# Patient Record
Sex: FEMALE | Race: WHITE | Employment: UNEMPLOYED | URBAN - METROPOLITAN AREA
[De-identification: names, ages, dates, MRNs, and addresses within clinical notes are randomized per-mention and may not be internally consistent; named-entity substitution may affect disease eponyms.]

---

## 2016-12-27 LAB
ANTIBODY SCREEN OB: NEGATIVE
HEPATITIS B SURFACE ANTIGEN OB: NEGATIVE
HIV RESULT OB: NEGATIVE
RAPID PLASMA REAGIN OB: NONREACTIVE
RH FACTOR OB: POSITIVE

## 2017-02-09 ENCOUNTER — OFFICE VISIT (OUTPATIENT)
Dept: FAMILY MEDICINE CLINIC | Facility: CLINIC | Age: 37
End: 2017-02-09

## 2017-02-09 VITALS
TEMPERATURE: 98 F | HEART RATE: 67 BPM | BODY MASS INDEX: 22.76 KG/M2 | OXYGEN SATURATION: 99 % | SYSTOLIC BLOOD PRESSURE: 120 MMHG | HEIGHT: 67 IN | DIASTOLIC BLOOD PRESSURE: 76 MMHG | WEIGHT: 145 LBS | RESPIRATION RATE: 12 BRPM

## 2017-02-09 DIAGNOSIS — J01.00 ACUTE NON-RECURRENT MAXILLARY SINUSITIS: Primary | ICD-10-CM

## 2017-02-09 PROCEDURE — 99213 OFFICE O/P EST LOW 20 MIN: CPT | Performed by: PHYSICIAN ASSISTANT

## 2017-02-09 RX ORDER — AZITHROMYCIN 250 MG/1
TABLET, FILM COATED ORAL
Qty: 6 TABLET | Refills: 0 | Status: ON HOLD | OUTPATIENT
Start: 2017-02-09 | End: 2017-08-02

## 2017-02-09 NOTE — PATIENT INSTRUCTIONS
Please follow up with your PCP if no improvement within 5-7 days. Go directly to the ER for any acute worsening of symptoms. Call OB and check first for z-pack.  Should be ok  · Return to clinic or follow up with PCP for further evaluation if symptoms are

## 2017-02-09 NOTE — PROGRESS NOTES
CHIEF COMPLAINT:   Patient presents with:  Sinus Problem: thick green mucous, nasal congestion. 10 days. Headache. Worsened in the past 5 days    HPI:   Thierry Duarte is a 29year old female who presents for sinus congestion for  10  days.  Symptoms hav Resp 12  Ht 67\"  Wt 145 lb  BMI 22.71 kg/m2  SpO2 99%  LMP   GENERAL: well developed, well nourished,in no apparent distress  SKIN: no rashes,no suspicious lesions  HEAD: atraumatic, normocephalic,  + tenderness on palpation of maxillary sinuses  EYES: c evaluation if symptoms are not improved within 48-72 hours  · Go to ER if facial or periorbital swelling develops  · Please take all of your antibiotic as prescribed or the infection will be treated ineffectively and may return  · Humidify the air.   Steam

## 2017-03-28 ENCOUNTER — OFFICE VISIT (OUTPATIENT)
Dept: PERINATAL CARE | Facility: HOSPITAL | Age: 37
End: 2017-03-28
Attending: OBSTETRICS & GYNECOLOGY
Payer: COMMERCIAL

## 2017-03-28 VITALS
HEIGHT: 67 IN | WEIGHT: 159 LBS | BODY MASS INDEX: 24.96 KG/M2 | SYSTOLIC BLOOD PRESSURE: 114 MMHG | HEART RATE: 86 BPM | DIASTOLIC BLOOD PRESSURE: 76 MMHG

## 2017-03-28 DIAGNOSIS — O09.812 PREGNANCY RESULTING FROM ASSISTED REPRODUCTIVE TECHNOLOGY, SECOND TRIMESTER: ICD-10-CM

## 2017-03-28 DIAGNOSIS — O09.522 AMA (ADVANCED MATERNAL AGE) MULTIGRAVIDA 35+, SECOND TRIMESTER: Primary | ICD-10-CM

## 2017-03-28 PROBLEM — O09.529 AMA (ADVANCED MATERNAL AGE) MULTIGRAVIDA 35+ (HCC): Status: ACTIVE | Noted: 2017-03-28

## 2017-03-28 PROBLEM — O09.819 PREGNANCY RESULTING FROM ASSISTED REPRODUCTIVE TECHNOLOGY (HCC): Status: ACTIVE | Noted: 2017-03-28

## 2017-03-28 PROBLEM — O09.819 PREGNANCY RESULTING FROM ASSISTED REPRODUCTIVE TECHNOLOGY: Status: ACTIVE | Noted: 2017-03-28

## 2017-03-28 PROBLEM — O09.529 AMA (ADVANCED MATERNAL AGE) MULTIGRAVIDA 35+: Status: ACTIVE | Noted: 2017-03-28

## 2017-03-28 PROCEDURE — 99243 OFF/OP CNSLTJ NEW/EST LOW 30: CPT

## 2017-03-28 RX ORDER — CHOLECALCIFEROL (VITAMIN D3) 25 MCG
1 TABLET,CHEWABLE ORAL DAILY
COMMUNITY
End: 2019-03-11 | Stop reason: ALTCHOICE

## 2017-03-28 NOTE — PROGRESS NOTES
Outpatient Maternal-Fetal Medicine Consultation    Dear Dr. Wilson Alfonso    Thank you for requesting ultrasound evaluation and maternal fetal medicine consultation on your patient Nirav Mattson.   As you are aware she is a 40year old female [id number] with a is alive. Medications:   Current outpatient prescriptions:   •  prenatal multivitamin plus DHA 27-0.8-228 MG Oral Cap, Take 1 capsule by mouth daily. , Disp: , Rfl:   •  azithromycin 250 MG Oral Tab, Take two tablets by mouth today, then one daily. , Dis with normal appearance: head, face, spine, neck, skin, chest, abdominal wall, gastrointestinal tract, kidneys, bladder, extremities.     Brain: Visualized and normal appearance: brain parenchyma, cerebral ventricles, choroid plexus, Cisterna Magna, midline that the patient will be 29years old at the time of delivery I reviewed that her risk (at delivery) of having a  with any chromosome abnormality is 1:192  and with trisomy 24 is 1: 80.     Invasive Testing    I offered invasive genetic testing (amn Stillbirth and  mortality rates appear to be increased as much as four-fold.     [facility] ART pregnancies, the relative risk of common pregnancy complications such as fetal growth restriction, preeclampsia, prematurity and  mortality are

## 2017-04-21 ENCOUNTER — OFFICE VISIT (OUTPATIENT)
Dept: PERINATAL CARE | Facility: HOSPITAL | Age: 37
End: 2017-04-21
Attending: OBSTETRICS & GYNECOLOGY
Payer: COMMERCIAL

## 2017-04-21 VITALS
DIASTOLIC BLOOD PRESSURE: 68 MMHG | HEART RATE: 88 BPM | SYSTOLIC BLOOD PRESSURE: 125 MMHG | BODY MASS INDEX: 25 KG/M2 | WEIGHT: 159 LBS

## 2017-04-21 DIAGNOSIS — O09.812 PREGNANCY RESULTING FROM ASSISTED REPRODUCTIVE TECHNOLOGY, SECOND TRIMESTER: ICD-10-CM

## 2017-04-21 DIAGNOSIS — O09.522 AMA (ADVANCED MATERNAL AGE) MULTIGRAVIDA 35+, SECOND TRIMESTER: Primary | ICD-10-CM

## 2017-04-21 PROCEDURE — 99213 OFFICE O/P EST LOW 20 MIN: CPT

## 2017-04-21 PROCEDURE — 93325 DOPPLER ECHO COLOR FLOW MAPG: CPT

## 2017-04-21 PROCEDURE — 76827 ECHO EXAM OF FETAL HEART: CPT

## 2017-04-21 PROCEDURE — 76825 ECHO EXAM OF FETAL HEART: CPT

## 2017-04-21 NOTE — PROGRESS NOTES
Indication: IVF.   ____________________________________________________________________________  History: Age: 40 years. Maternal age at [facility]: 40 years. : 5 Para: 2.  Last menstrual period: 2016.  ________________________________________________ pericardial or pleural effusion. The A-V conduction is one to one and the heart rate is appropriate for gestational age. No evidence of fetal arrhythmias is seen during today's study. There is a right to left shunting across the patent foramen ovale.   Valeta Rank

## 2017-07-17 LAB — STREP GP B CULT OB: NEGATIVE

## 2017-07-30 ENCOUNTER — HOSPITAL ENCOUNTER (OUTPATIENT)
Facility: HOSPITAL | Age: 37
Setting detail: OBSERVATION
Discharge: HOME | End: 2017-07-30
Attending: OBSTETRICS & GYNECOLOGY | Admitting: OBSTETRICS & GYNECOLOGY
Payer: COMMERCIAL

## 2017-07-30 VITALS
HEIGHT: 66 IN | HEART RATE: 96 BPM | BODY MASS INDEX: 28.93 KG/M2 | SYSTOLIC BLOOD PRESSURE: 125 MMHG | WEIGHT: 180 LBS | DIASTOLIC BLOOD PRESSURE: 86 MMHG | RESPIRATION RATE: 22 BRPM | TEMPERATURE: 98 F

## 2017-07-30 VITALS
SYSTOLIC BLOOD PRESSURE: 128 MMHG | WEIGHT: 180 LBS | BODY MASS INDEX: 28.93 KG/M2 | HEIGHT: 66 IN | TEMPERATURE: 99 F | DIASTOLIC BLOOD PRESSURE: 83 MMHG | RESPIRATION RATE: 18 BRPM | HEART RATE: 96 BPM

## 2017-07-30 PROBLEM — Z34.90 PREGNANCY: Status: ACTIVE | Noted: 2017-07-30

## 2017-07-30 PROBLEM — Z34.90 PREGNANCY (HCC): Status: ACTIVE | Noted: 2017-07-30

## 2017-07-30 LAB
BILIRUBIN URINE: NEGATIVE
BILIRUBIN URINE: NEGATIVE
BLOOD URINE: NEGATIVE
CONTROL RUN WITHIN 24 HOURS?: YES
CONTROL RUN WITHIN 24 HOURS?: YES
GLUCOSE URINE: NEGATIVE
GLUCOSE URINE: NEGATIVE
KETONE URINE: NEGATIVE
KETONE URINE: NEGATIVE
LEUKOCYTE ESTERASE URINE: NEGATIVE
NITRITE URINE: NEGATIVE
NITRITE URINE: NEGATIVE
PH URINE: 6 (ref 5–8)
PH URINE: 7 (ref 5–8)
PROTEIN URINE: 30
PROTEIN URINE: NEGATIVE
SPEC GRAVITY: 1.01 (ref 1–1.03)
SPEC GRAVITY: 1.02 (ref 1–1.03)
URINE CLARITY: CLEAR
URINE CLARITY: CLEAR
URINE COLOR: YELLOW
URINE COLOR: YELLOW
UROBILINOGEN URINE: 0.2
UROBILINOGEN URINE: 0.2

## 2017-07-30 PROCEDURE — 59025 FETAL NON-STRESS TEST: CPT

## 2017-07-30 PROCEDURE — 81002 URINALYSIS NONAUTO W/O SCOPE: CPT

## 2017-07-30 RX ORDER — MELATONIN
325
Status: ON HOLD | COMMUNITY
End: 2017-08-02

## 2017-07-30 RX ORDER — GARLIC EXTRACT 500 MG
1 CAPSULE ORAL DAILY
COMMUNITY
End: 2019-03-11 | Stop reason: ALTCHOICE

## 2017-07-30 RX ORDER — PYRIDOXINE HCL (VITAMIN B6) 100 MG
TABLET ORAL
COMMUNITY
End: 2017-12-05

## 2017-07-30 NOTE — PROGRESS NOTES
Pt is a 40year old female admitted to [facility]. Patient presents with:  R/o Labor     Pt is [id number] 38w3d intra-uterine pregnancy. History obtained, consents signed. Oriented to room, staff, and plan of care.

## 2017-07-30 NOTE — NST
Nonstress Test   Patient: Ira Zimmerman    Gestation: 38w3d    NST:       Variability: Moderate           Accelerations: Yes           Decelerations: None            Baseline: 130 BPM           Uterine Irritability: Yes           Contractions: Nito Ring

## 2017-07-30 NOTE — PROGRESS NOTES
Pt is a 40year old female admitted to [facility]. Patient presents with:  R/o Labor: C/o icreasingly painful contractions beginning at 0800 on 07/29      Pt is [id number] 38w3d intra-uterine pregnancy. History obtained, consents signed.  Oriented to room,

## 2017-07-30 NOTE — PROGRESS NOTES
Pt. Up to walk x2 hours and then to be re-evaluated for labor. Pt. Walking with her  and instructed to return sooner with SROM or bleeding or more intense contractions.

## 2017-07-31 ENCOUNTER — HOSPITAL ENCOUNTER (INPATIENT)
Facility: HOSPITAL | Age: 37
LOS: 2 days | Discharge: HOME | End: 2017-08-02
Attending: OBSTETRICS & GYNECOLOGY | Admitting: OBSTETRICS & GYNECOLOGY
Payer: COMMERCIAL

## 2017-07-31 LAB
ANTIBODY SCREEN: NEGATIVE
BILIRUBIN URINE: NEGATIVE
CONTROL RUN WITHIN 24 HOURS?: YES
ERYTHROCYTE [DISTWIDTH] IN BLOOD BY AUTOMATED COUNT: 12.8 % (ref 11.5–16)
GLUCOSE URINE: NEGATIVE
HCT VFR BLD AUTO: 36.1 % (ref 34–50)
HGB BLD-MCNC: 12.4 G/DL (ref 12–16)
KETONE URINE: NEGATIVE
LEUKOCYTE ESTERASE URINE: NEGATIVE
MCH RBC QN AUTO: 31.6 PG (ref 27–33.2)
MCHC RBC AUTO-ENTMCNC: 34.3 G/DL (ref 31–37)
MCV RBC AUTO: 92.1 FL (ref 81–100)
NITRITE URINE: NEGATIVE
PH URINE: 7 (ref 5–8)
PLATELET # BLD AUTO: 164 10(3)UL (ref 150–450)
PROTEIN URINE: NEGATIVE
RBC # BLD AUTO: 3.92 X10(6)UL (ref 3.8–5.1)
RED CELL DISTRIBUTION WIDTH-SD: 42.6 FL (ref 35.1–46.3)
RH BLOOD TYPE: POSITIVE
SPEC GRAVITY: 1.01 (ref 1–1.03)
T PALLIDUM AB SER QL IA: NONREACTIVE
URINE CLARITY: CLEAR
URINE COLOR: YELLOW
UROBILINOGEN URINE: 0.2
WBC # BLD AUTO: 6.8 X10(3) UL (ref 4–13)

## 2017-07-31 PROCEDURE — 86901 BLOOD TYPING SEROLOGIC RH(D): CPT | Performed by: OBSTETRICS & GYNECOLOGY

## 2017-07-31 PROCEDURE — 0KQM0ZZ REPAIR PERINEUM MUSCLE, OPEN APPROACH: ICD-10-PCS | Performed by: OBSTETRICS & GYNECOLOGY

## 2017-07-31 PROCEDURE — 86900 BLOOD TYPING SEROLOGIC ABO: CPT | Performed by: OBSTETRICS & GYNECOLOGY

## 2017-07-31 PROCEDURE — 85027 COMPLETE CBC AUTOMATED: CPT | Performed by: OBSTETRICS & GYNECOLOGY

## 2017-07-31 PROCEDURE — 81002 URINALYSIS NONAUTO W/O SCOPE: CPT

## 2017-07-31 PROCEDURE — 3E033VJ INTRODUCTION OF OTHER HORMONE INTO PERIPHERAL VEIN, PERCUTANEOUS APPROACH: ICD-10-PCS | Performed by: OBSTETRICS & GYNECOLOGY

## 2017-07-31 PROCEDURE — 86780 TREPONEMA PALLIDUM: CPT | Performed by: OBSTETRICS & GYNECOLOGY

## 2017-07-31 PROCEDURE — 86850 RBC ANTIBODY SCREEN: CPT | Performed by: OBSTETRICS & GYNECOLOGY

## 2017-07-31 RX ORDER — IBUPROFEN 600 MG/1
600 TABLET ORAL ONCE AS NEEDED
Status: DISCONTINUED | OUTPATIENT
Start: 2017-07-31 | End: 2017-08-02

## 2017-07-31 RX ORDER — SODIUM CHLORIDE, SODIUM LACTATE, POTASSIUM CHLORIDE, CALCIUM CHLORIDE 600; 310; 30; 20 MG/100ML; MG/100ML; MG/100ML; MG/100ML
INJECTION, SOLUTION INTRAVENOUS CONTINUOUS
Status: DISCONTINUED | OUTPATIENT
Start: 2017-07-31 | End: 2017-08-02

## 2017-07-31 RX ORDER — TERBUTALINE SULFATE 1 MG/ML
0.25 INJECTION, SOLUTION SUBCUTANEOUS AS NEEDED
Status: DISCONTINUED | OUTPATIENT
Start: 2017-07-31 | End: 2017-07-31 | Stop reason: HOSPADM

## 2017-07-31 RX ORDER — SIMETHICONE 80 MG
80 TABLET,CHEWABLE ORAL 3 TIMES DAILY PRN
Status: DISCONTINUED | OUTPATIENT
Start: 2017-07-31 | End: 2017-08-02

## 2017-07-31 RX ORDER — DEXTROSE, SODIUM CHLORIDE, SODIUM LACTATE, POTASSIUM CHLORIDE, AND CALCIUM CHLORIDE 5; .6; .31; .03; .02 G/100ML; G/100ML; G/100ML; G/100ML; G/100ML
INJECTION, SOLUTION INTRAVENOUS AS NEEDED
Status: DISCONTINUED | OUTPATIENT
Start: 2017-07-31 | End: 2017-07-31 | Stop reason: HOSPADM

## 2017-07-31 RX ORDER — ONDANSETRON 2 MG/ML
4 INJECTION INTRAMUSCULAR; INTRAVENOUS EVERY 6 HOURS PRN
Status: DISCONTINUED | OUTPATIENT
Start: 2017-07-31 | End: 2017-08-02

## 2017-07-31 RX ORDER — NALBUPHINE HCL 10 MG/ML
2.5 AMPUL (ML) INJECTION
Status: DISCONTINUED | OUTPATIENT
Start: 2017-07-31 | End: 2017-08-02

## 2017-07-31 RX ORDER — ZOLPIDEM TARTRATE 5 MG/1
5 TABLET ORAL NIGHTLY PRN
Status: DISCONTINUED | OUTPATIENT
Start: 2017-07-31 | End: 2017-08-02

## 2017-07-31 RX ORDER — HYDROCODONE BITARTRATE AND ACETAMINOPHEN 5; 325 MG/1; MG/1
1 TABLET ORAL EVERY 4 HOURS PRN
Status: DISCONTINUED | OUTPATIENT
Start: 2017-07-31 | End: 2017-08-02

## 2017-07-31 RX ORDER — DOCUSATE SODIUM 100 MG/1
100 CAPSULE, LIQUID FILLED ORAL
Status: DISCONTINUED | OUTPATIENT
Start: 2017-07-31 | End: 2017-08-02

## 2017-07-31 RX ORDER — ACETAMINOPHEN 325 MG/1
650 TABLET ORAL EVERY 4 HOURS PRN
Status: DISCONTINUED | OUTPATIENT
Start: 2017-07-31 | End: 2017-08-02

## 2017-07-31 RX ORDER — IBUPROFEN 600 MG/1
600 TABLET ORAL EVERY 6 HOURS
Status: DISCONTINUED | OUTPATIENT
Start: 2017-07-31 | End: 2017-08-02

## 2017-07-31 RX ORDER — BISACODYL 10 MG
10 SUPPOSITORY, RECTAL RECTAL ONCE AS NEEDED
Status: ACTIVE | OUTPATIENT
Start: 2017-07-31 | End: 2017-07-31

## 2017-07-31 RX ORDER — EPHEDRINE SULFATE 50 MG/ML
5 INJECTION, SOLUTION INTRAVENOUS AS NEEDED
Status: DISCONTINUED | OUTPATIENT
Start: 2017-07-31 | End: 2017-07-31

## 2017-07-31 RX ORDER — HYDROCODONE BITARTRATE AND ACETAMINOPHEN 5; 325 MG/1; MG/1
2 TABLET ORAL EVERY 4 HOURS PRN
Status: DISCONTINUED | OUTPATIENT
Start: 2017-07-31 | End: 2017-08-02

## 2017-07-31 NOTE — NST
Nonstress Test   Patient: Norm Wynn    Gestation: 38w3d    NST:       Variability: Moderate           Accelerations: Yes           Decelerations: None            Baseline: 130 BPM           Uterine Irritability: Yes           Contractions: Rashida Rigmiki

## 2017-08-01 LAB
BASOPHILS # BLD AUTO: 0.02 X10(3) UL (ref 0–0.1)
BASOPHILS NFR BLD AUTO: 0.2 %
EOSINOPHIL # BLD AUTO: 0.04 X10(3) UL (ref 0–0.3)
EOSINOPHIL NFR BLD AUTO: 0.4 %
ERYTHROCYTE [DISTWIDTH] IN BLOOD BY AUTOMATED COUNT: 12.7 % (ref 11.5–16)
HCT VFR BLD AUTO: 33.3 % (ref 34–50)
HGB BLD-MCNC: 11.4 G/DL (ref 12–16)
IMMATURE GRANULOCYTE COUNT: 0.05 X10(3) UL (ref 0–1)
IMMATURE GRANULOCYTE RATIO %: 0.5 %
LYMPHOCYTES # BLD AUTO: 1.7 X10(3) UL (ref 0.9–4)
LYMPHOCYTES NFR BLD AUTO: 17 %
MCH RBC QN AUTO: 31.9 PG (ref 27–33.2)
MCHC RBC AUTO-ENTMCNC: 34.2 G/DL (ref 31–37)
MCV RBC AUTO: 93.3 FL (ref 81–100)
MONOCYTES # BLD AUTO: 0.79 X10(3) UL (ref 0.1–0.6)
MONOCYTES NFR BLD AUTO: 7.9 %
NEUTROPHIL ABS PRELIM: 7.41 X10 (3) UL (ref 1.3–6.7)
NEUTROPHILS # BLD AUTO: 7.41 X10(3) UL (ref 1.3–6.7)
NEUTROPHILS NFR BLD AUTO: 74 %
PLATELET # BLD AUTO: 133 10(3)UL (ref 150–450)
RBC # BLD AUTO: 3.57 X10(6)UL (ref 3.8–5.1)
RED CELL DISTRIBUTION WIDTH-SD: 43.1 FL (ref 35.1–46.3)
WBC # BLD AUTO: 10 X10(3) UL (ref 4–13)

## 2017-08-01 PROCEDURE — 85025 COMPLETE CBC W/AUTO DIFF WBC: CPT | Performed by: OBSTETRICS & GYNECOLOGY

## 2017-08-01 NOTE — L&D DELIVERY NOTE
[facility]    PATIENT'S NAME: Rena Silvate   ATTENDING PHYSICIAN: Kenneth Schaefer M.D.    PATIENT ACCOUNT #: [de-identified] LOCATION:  [address] [place] [address]   MEDICAL RECORD #: [id number] YOB: 1980   ADMISSION DATE: 07/31/2017 DELIVERY DATE:

## 2017-08-01 NOTE — BRIEF PROCEDURE NOTE
Delivery Note    Date:17    Preop diagnosis: IUP at term 38 5/7 weeks, oligohydramnios, induction    Postop diagnosis: same    Procedure:     Attending: Betina Moon    Findings: viable  Female infant, weighing 7,7 oz, APGARs

## 2017-08-01 NOTE — PROGRESS NOTES
[facility]  Post-Partum Vaginal Delivery Progress Note    Yoandy Martinez Patient Status:  Inpatient    3/17/1980 MRN [id number]   [facility] 2SW-J Attending Cathleen Dugan MD, MD   Hosp Day # 1 PCP Devin Stafford MD     [facility]

## 2017-08-01 NOTE — PROGRESS NOTES
Patient up to bathroom with assist x 2. Voided 300mL. Patient transferred to mother/baby room 2216 per wheelchair in stable condition with baby and personal belongings. Accompanied by significant other and staff. Report given to mother/baby RNTasneem.

## 2017-08-01 NOTE — H&P
[facility]    PATIENT'S NAME: Deya Tobar   ATTENDING PHYSICIAN: Myah Bell M.D.    PATIENT ACCOUNT#:   [de-identified]    LOCATION:  [address] [place] [address]  MEDICAL RECORD #:   [id number]       YOB: 1980  ADMISSION DATE:       07/31/201 is known to have diminished ovarian reserve. PAST SURGICAL HISTORY:  D and C and skin graft. MEDICATIONS:  Iron, prenatal vitamins, and Zantac. ALLERGIES:  Penicillin and amoxicillin where she experiences a rash.     FAMILY HISTORY:  Heart disease

## 2017-08-02 VITALS
RESPIRATION RATE: 18 BRPM | HEART RATE: 75 BPM | HEIGHT: 67 IN | OXYGEN SATURATION: 100 % | WEIGHT: 178 LBS | SYSTOLIC BLOOD PRESSURE: 120 MMHG | BODY MASS INDEX: 27.94 KG/M2 | DIASTOLIC BLOOD PRESSURE: 84 MMHG | TEMPERATURE: 99 F

## 2017-08-02 RX ORDER — IBUPROFEN 600 MG/1
600 TABLET ORAL EVERY 6 HOURS PRN
Qty: 30 TABLET | Refills: 0 | Status: SHIPPED | OUTPATIENT
Start: 2017-08-02 | End: 2017-12-05

## 2017-08-02 NOTE — DISCHARGE SUMMARY
[facility]  Discharge Summary    Kiran Burk Patient Status:  Inpatient    3/17/1980 MRN [id number]   [facility] 2SW-J Attending Efrain Robin MD, MD   [facility] Day # 2 PCP Josette Manuel MD     Date of Admission: 2017    Da

## 2017-08-02 NOTE — PROGRESS NOTES
PPD2    S: doing well, bleeding minimal, pain ok with po meds  Breastfeeding well, denies preeclampsia symptoms  O: /84 (BP Location: Left arm)   Pulse 75   Temp 98.7 °F (37.1 °C)   Resp 18   Ht 5' 7\" (1.702 m)   Wt 178 lb (80.7 kg)   LMP 11/03/2016

## 2017-08-08 ENCOUNTER — TELEPHONE (OUTPATIENT)
Dept: OBGYN UNIT | Facility: HOSPITAL | Age: 37
End: 2017-08-08

## 2017-10-07 ENCOUNTER — IMMUNIZATION (OUTPATIENT)
Dept: FAMILY MEDICINE CLINIC | Facility: CLINIC | Age: 37
End: 2017-10-07

## 2017-10-07 DIAGNOSIS — Z23 NEED FOR VACCINATION: ICD-10-CM

## 2017-10-07 PROCEDURE — 90471 IMMUNIZATION ADMIN: CPT | Performed by: PHYSICIAN ASSISTANT

## 2017-10-07 PROCEDURE — 90686 IIV4 VACC NO PRSV 0.5 ML IM: CPT | Performed by: PHYSICIAN ASSISTANT

## 2017-10-20 ENCOUNTER — TELEPHONE (OUTPATIENT)
Dept: FAMILY MEDICINE CLINIC | Facility: CLINIC | Age: 37
End: 2017-10-20

## 2017-10-20 NOTE — TELEPHONE ENCOUNTER
Patient scheduled her WWE no pap 12/5/17 Dr. Noemi Valentin, patient not sure if labs are needed. She just had a baby. If labs are needed please call into [facility].

## 2017-10-20 NOTE — TELEPHONE ENCOUNTER
Called and talked to patient informed her that no labs now will discuss at the visit she is OK with this

## 2017-12-05 ENCOUNTER — OFFICE VISIT (OUTPATIENT)
Dept: FAMILY MEDICINE CLINIC | Facility: CLINIC | Age: 37
End: 2017-12-05

## 2017-12-05 VITALS
HEART RATE: 84 BPM | HEIGHT: 66 IN | DIASTOLIC BLOOD PRESSURE: 70 MMHG | WEIGHT: 149 LBS | RESPIRATION RATE: 16 BRPM | TEMPERATURE: 98 F | SYSTOLIC BLOOD PRESSURE: 118 MMHG | BODY MASS INDEX: 23.95 KG/M2

## 2017-12-05 DIAGNOSIS — Z00.00 ROUTINE GENERAL MEDICAL EXAMINATION AT A HEALTH CARE FACILITY: Primary | ICD-10-CM

## 2017-12-05 PROBLEM — Z22.322 MRSA NASAL COLONIZATION: Status: ACTIVE | Noted: 2017-12-05

## 2017-12-05 PROCEDURE — 99395 PREV VISIT EST AGE 18-39: CPT | Performed by: FAMILY MEDICINE

## 2017-12-05 NOTE — PROGRESS NOTES
HPI:   Thierry Duarte is a 40year old female who presents for a complete physical exam.  Last pap:  See GYN, going in Feb  Last mammogram:  No previous, no fhx of breast    Self exams:  yes  Menses:  none  Contraception:  Condoms,  may get vasec Smokeless tobacco: Never Used                      Alcohol use: Yes           0.0 oz/week     Comment: 1 drink daily       REVIEW OF SYSTEMS:   GENERAL: feels well otherwise  SKIN: denies any unusual skin lesions  EYES:no vision

## 2018-02-28 ENCOUNTER — OFFICE VISIT (OUTPATIENT)
Dept: FAMILY MEDICINE CLINIC | Facility: CLINIC | Age: 38
End: 2018-02-28

## 2018-02-28 VITALS
HEART RATE: 84 BPM | WEIGHT: 146 LBS | DIASTOLIC BLOOD PRESSURE: 74 MMHG | HEIGHT: 67 IN | RESPIRATION RATE: 16 BRPM | TEMPERATURE: 98 F | BODY MASS INDEX: 22.91 KG/M2 | SYSTOLIC BLOOD PRESSURE: 104 MMHG

## 2018-02-28 DIAGNOSIS — R05.9 COUGH: ICD-10-CM

## 2018-02-28 DIAGNOSIS — J02.9 SORE THROAT: Primary | ICD-10-CM

## 2018-02-28 LAB
CONTROL LINE PRESENT WITH A CLEAR BACKGROUND (YES/NO): YES YES/NO
STREP GRP A CUL-SCR: NEGATIVE

## 2018-02-28 PROCEDURE — 99213 OFFICE O/P EST LOW 20 MIN: CPT | Performed by: PHYSICIAN ASSISTANT

## 2018-02-28 PROCEDURE — 87880 STREP A ASSAY W/OPTIC: CPT | Performed by: PHYSICIAN ASSISTANT

## 2018-02-28 NOTE — PROGRESS NOTES
CC:  Patient presents with:  Sore Throat: painful x 2 days, difficulty swallowing  Cough: productive cough started today      HPI: All Ennis presents with complaints of a cough and ST. Symptoms have been present for 2 days. She has taken nothing OTC.  She de Temp 97.7 °F (36.5 °C) (Oral)   Resp 16   Ht 67\"   Wt 146 lb   BMI 22.87 kg/m²     Vital signs reviewed.     Constitutional: Well developed, well nourished, in no acute distress  HENT:   Head: Normocephalic, atraumatic  Ears: No FBs, otorrhea, edema, or e effects or complications from the treatments as a result of today.     Reviewed Past Medical History and   Patient Active Problem List:     AMA (advanced maternal age) multigravida 33+     Pregnancy resulting from assisted reproductive technology     Pregna

## 2018-07-11 ENCOUNTER — OFFICE VISIT (OUTPATIENT)
Dept: FAMILY MEDICINE CLINIC | Facility: CLINIC | Age: 38
End: 2018-07-11

## 2018-07-11 VITALS
OXYGEN SATURATION: 98 % | BODY MASS INDEX: 22.76 KG/M2 | RESPIRATION RATE: 18 BRPM | HEART RATE: 72 BPM | HEIGHT: 67 IN | DIASTOLIC BLOOD PRESSURE: 64 MMHG | SYSTOLIC BLOOD PRESSURE: 112 MMHG | WEIGHT: 145 LBS | TEMPERATURE: 98 F

## 2018-07-11 DIAGNOSIS — H69.81 ACUTE DYSFUNCTION OF RIGHT EUSTACHIAN TUBE: Primary | ICD-10-CM

## 2018-07-11 PROCEDURE — 99213 OFFICE O/P EST LOW 20 MIN: CPT | Performed by: NURSE PRACTITIONER

## 2018-07-11 NOTE — PROGRESS NOTES
CHIEF COMPLAINT:   Patient presents with:  Ear Problem: rt ear clogged x 2 wks       HPI:   Teresita March is a 41year old female who presents to clinic today with complaints of unilateral RIGHT ear pressure/fullness/decreased hearing.   Has had consi EARS: Tragus non tender on palpation bilaterally. External auditory canals healthy. Right TM: +Dull but non erythematous, no bulging, no retraction,+small cloudy fluid, bony landmarks visible.   Left TM: Normal, no bulging, no retraction,no effusion, bony l OME can happen when you have a cold if congestion blocks the passage that drains the middle ear. This passage is called the eustachian tube. OME may also occur with nasal allergies or after a bacterial middle ear infection.     The pain or discomfort may co · Antihistamines may help if you are also having allergy symptoms. · You may use medicines such as guaifenesin to thin mucus and promote drainage.   Follow-up care  Follow up with your healthcare provider or as advised if you are not feeling better after 3

## 2018-10-15 ENCOUNTER — IMMUNIZATION (OUTPATIENT)
Dept: FAMILY MEDICINE CLINIC | Facility: CLINIC | Age: 38
End: 2018-10-15
Payer: COMMERCIAL

## 2018-10-15 DIAGNOSIS — Z23 NEED FOR VACCINATION: ICD-10-CM

## 2018-10-15 PROCEDURE — 90471 IMMUNIZATION ADMIN: CPT | Performed by: NURSE PRACTITIONER

## 2018-10-15 PROCEDURE — 90686 IIV4 VACC NO PRSV 0.5 ML IM: CPT | Performed by: NURSE PRACTITIONER

## 2018-12-19 ENCOUNTER — PATIENT MESSAGE (OUTPATIENT)
Dept: FAMILY MEDICINE CLINIC | Facility: CLINIC | Age: 38
End: 2018-12-19

## 2018-12-19 ENCOUNTER — TELEPHONE (OUTPATIENT)
Dept: FAMILY MEDICINE CLINIC | Facility: CLINIC | Age: 38
End: 2018-12-19

## 2018-12-19 DIAGNOSIS — Z00.00 ROUTINE GENERAL MEDICAL EXAMINATION AT A HEALTH CARE FACILITY: Primary | ICD-10-CM

## 2018-12-19 DIAGNOSIS — Z00.00 LABORATORY EXAM ORDERED AS PART OF ROUTINE GENERAL MEDICAL EXAMINATION: Primary | ICD-10-CM

## 2018-12-19 NOTE — TELEPHONE ENCOUNTER
Please enter lab orders for the patient's upcoming physical appointment. Physical scheduled:    Your appointments     Date & Time Appointment Department [facility])    Jan 28, 2019  9:15 AM CST Physical - Established Patient with MD Lidia Allen

## 2018-12-19 NOTE — TELEPHONE ENCOUNTER
From: Jon Ivey  To:  Sandra Loyd MD  Sent: 12/19/2018 7:04 AM CST  Subject: Non-Urgent Medical Question    Hi Dr. Delilah Car,    I made an appt for a physical in late January and I was wondering if I should get bloodwork done prior to that so we c

## 2019-01-14 ENCOUNTER — APPOINTMENT (OUTPATIENT)
Dept: LAB | Age: 39
End: 2019-01-14
Attending: FAMILY MEDICINE
Payer: COMMERCIAL

## 2019-01-14 DIAGNOSIS — Z00.00 ROUTINE GENERAL MEDICAL EXAMINATION AT A HEALTH CARE FACILITY: ICD-10-CM

## 2019-01-14 DIAGNOSIS — Z00.00 LABORATORY EXAM ORDERED AS PART OF ROUTINE GENERAL MEDICAL EXAMINATION: ICD-10-CM

## 2019-01-14 LAB
ALBUMIN SERPL-MCNC: 3.6 G/DL (ref 3.1–4.5)
ALBUMIN/GLOB SERPL: 1.1 {RATIO} (ref 1–2)
ALP LIVER SERPL-CCNC: 76 U/L (ref 37–98)
ALT SERPL-CCNC: 24 U/L (ref 14–54)
ANION GAP SERPL CALC-SCNC: 6 MMOL/L (ref 0–18)
AST SERPL-CCNC: 19 U/L (ref 15–41)
BILIRUB SERPL-MCNC: 0.5 MG/DL (ref 0.1–2)
BUN BLD-MCNC: 11 MG/DL (ref 8–20)
BUN/CREAT SERPL: 12.1 (ref 10–20)
CALCIUM BLD-MCNC: 8.6 MG/DL (ref 8.3–10.3)
CHLORIDE SERPL-SCNC: 107 MMOL/L (ref 101–111)
CHOLEST SMN-MCNC: 173 MG/DL (ref ?–200)
CO2 SERPL-SCNC: 30 MMOL/L (ref 22–32)
CREAT BLD-MCNC: 0.91 MG/DL (ref 0.55–1.02)
ERYTHROCYTE [DISTWIDTH] IN BLOOD BY AUTOMATED COUNT: 12.5 % (ref 11.5–16)
GLOBULIN PLAS-MCNC: 3.2 G/DL (ref 2.8–4.4)
GLUCOSE BLD-MCNC: 94 MG/DL (ref 70–99)
HCT VFR BLD AUTO: 38.6 % (ref 34–50)
HDLC SERPL-MCNC: 80 MG/DL (ref 40–59)
HGB BLD-MCNC: 12.4 G/DL (ref 12–16)
LDLC SERPL CALC-MCNC: 83 MG/DL (ref ?–100)
M PROTEIN MFR SERPL ELPH: 6.8 G/DL (ref 6.4–8.2)
MCH RBC QN AUTO: 30.5 PG (ref 27–33.2)
MCHC RBC AUTO-ENTMCNC: 32.1 G/DL (ref 31–37)
MCV RBC AUTO: 95.1 FL (ref 81–100)
NONHDLC SERPL-MCNC: 93 MG/DL (ref ?–130)
OSMOLALITY SERPL CALC.SUM OF ELEC: 295 MOSM/KG (ref 275–295)
PLATELET # BLD AUTO: 225 10(3)UL (ref 150–450)
POTASSIUM SERPL-SCNC: 4 MMOL/L (ref 3.6–5.1)
RBC # BLD AUTO: 4.06 X10(6)UL (ref 3.8–5.1)
RED CELL DISTRIBUTION WIDTH-SD: 43.7 FL (ref 35.1–46.3)
SODIUM SERPL-SCNC: 143 MMOL/L (ref 136–144)
TRIGL SERPL-MCNC: 51 MG/DL (ref 30–149)
TSI SER-ACNC: 1.59 MIU/ML (ref 0.35–5.5)
VLDLC SERPL CALC-MCNC: 10 MG/DL (ref 0–30)
WBC # BLD AUTO: 4.3 X10(3) UL (ref 4–13)

## 2019-01-14 PROCEDURE — 80061 LIPID PANEL: CPT | Performed by: FAMILY MEDICINE

## 2019-01-14 PROCEDURE — 36415 COLL VENOUS BLD VENIPUNCTURE: CPT | Performed by: FAMILY MEDICINE

## 2019-01-14 PROCEDURE — 80050 GENERAL HEALTH PANEL: CPT | Performed by: FAMILY MEDICINE

## 2019-03-12 ENCOUNTER — APPOINTMENT (OUTPATIENT)
Dept: LAB | Facility: HOSPITAL | Age: 39
End: 2019-03-12
Attending: STUDENT IN AN ORGANIZED HEALTH CARE EDUCATION/TRAINING PROGRAM
Payer: COMMERCIAL

## 2019-03-12 DIAGNOSIS — O02.1 MISSED ABORTION: ICD-10-CM

## 2019-03-12 LAB
DEPRECATED RDW RBC AUTO: 41.8 FL (ref 35.1–46.3)
ERYTHROCYTE [DISTWIDTH] IN BLOOD BY AUTOMATED COUNT: 12.5 % (ref 11–15)
HCT VFR BLD AUTO: 37.4 % (ref 35–48)
HGB BLD-MCNC: 12.5 G/DL (ref 12–16)
MCH RBC QN AUTO: 30.6 PG (ref 26–34)
MCHC RBC AUTO-ENTMCNC: 33.4 G/DL (ref 31–37)
MCV RBC AUTO: 91.4 FL (ref 80–100)
PLATELET # BLD AUTO: 204 10(3)UL (ref 150–450)
RBC # BLD AUTO: 4.09 X10(6)UL (ref 3.8–5.3)
WBC # BLD AUTO: 5.6 X10(3) UL (ref 4–11)

## 2019-03-12 PROCEDURE — 85027 COMPLETE CBC AUTOMATED: CPT

## 2019-03-12 PROCEDURE — 36415 COLL VENOUS BLD VENIPUNCTURE: CPT

## 2019-03-20 NOTE — H&P
[facility]    History and Physical    Sable Bars Patient Status:  Hospital Outpatient Surgery    3/17/1980 MRN [id number]   [facility] SURGERY Attending Saniya Romero MD   Hosp Day # 0 PCP Deng Melara MD     Date:  3/20/ removal     Family History   Problem Relation Age of Onset   • High Blood Pressure Mother    • Heart Disorder Paternal Grandfather    • Heart Disease Maternal Grandfather    • Heart Attack Maternal Grandfather      Social History:  Social History    Tobacc positive Antibody negative    137  /   102  /  11  --------------------< 92  4.6  /  20  /  0.6                12.4  6.5 >---------< 177            36.7      Assessment/Plan:    The patient is a [id number] with LMP 1/2/19 who presents for surgical manage

## 2019-03-21 ENCOUNTER — ANESTHESIA (OUTPATIENT)
Dept: SURGERY | Facility: HOSPITAL | Age: 39
End: 2019-03-21
Payer: COMMERCIAL

## 2019-03-21 ENCOUNTER — HOSPITAL ENCOUNTER (OUTPATIENT)
Facility: HOSPITAL | Age: 39
Setting detail: HOSPITAL OUTPATIENT SURGERY
Discharge: HOME | End: 2019-03-21
Attending: STUDENT IN AN ORGANIZED HEALTH CARE EDUCATION/TRAINING PROGRAM | Admitting: STUDENT IN AN ORGANIZED HEALTH CARE EDUCATION/TRAINING PROGRAM
Payer: COMMERCIAL

## 2019-03-21 ENCOUNTER — ANESTHESIA EVENT (OUTPATIENT)
Dept: SURGERY | Facility: HOSPITAL | Age: 39
End: 2019-03-21
Payer: COMMERCIAL

## 2019-03-21 VITALS
DIASTOLIC BLOOD PRESSURE: 68 MMHG | TEMPERATURE: 98 F | RESPIRATION RATE: 16 BRPM | OXYGEN SATURATION: 99 % | SYSTOLIC BLOOD PRESSURE: 111 MMHG | HEIGHT: 67 IN | HEART RATE: 60 BPM | BODY MASS INDEX: 22.63 KG/M2 | WEIGHT: 144.19 LBS

## 2019-03-21 DIAGNOSIS — O02.1 MISSED ABORTION: Primary | ICD-10-CM

## 2019-03-21 PROBLEM — O09.819 PREGNANCY RESULTING FROM ASSISTED REPRODUCTIVE TECHNOLOGY: Status: RESOLVED | Noted: 2017-03-28 | Resolved: 2019-03-21

## 2019-03-21 PROBLEM — Z34.90 PREGNANCY: Status: RESOLVED | Noted: 2017-07-30 | Resolved: 2019-03-21

## 2019-03-21 PROBLEM — Z22.322 MRSA NASAL COLONIZATION: Status: RESOLVED | Noted: 2017-12-05 | Resolved: 2019-03-21

## 2019-03-21 PROBLEM — O09.529 AMA (ADVANCED MATERNAL AGE) MULTIGRAVIDA 35+: Status: RESOLVED | Noted: 2017-03-28 | Resolved: 2019-03-21

## 2019-03-21 PROBLEM — O09.819 PREGNANCY RESULTING FROM ASSISTED REPRODUCTIVE TECHNOLOGY (HCC): Status: RESOLVED | Noted: 2017-03-28 | Resolved: 2019-03-21

## 2019-03-21 PROBLEM — O09.529 AMA (ADVANCED MATERNAL AGE) MULTIGRAVIDA 35+ (HCC): Status: RESOLVED | Noted: 2017-03-28 | Resolved: 2019-03-21

## 2019-03-21 PROBLEM — Z34.90 PREGNANCY (HCC): Status: RESOLVED | Noted: 2017-07-30 | Resolved: 2019-03-21

## 2019-03-21 PROCEDURE — 88305 TISSUE EXAM BY PATHOLOGIST: CPT | Performed by: STUDENT IN AN ORGANIZED HEALTH CARE EDUCATION/TRAINING PROGRAM

## 2019-03-21 PROCEDURE — 10D17ZZ EXTRACTION OF PRODUCTS OF CONCEPTION, RETAINED, VIA NATURAL OR ARTIFICIAL OPENING: ICD-10-PCS | Performed by: STUDENT IN AN ORGANIZED HEALTH CARE EDUCATION/TRAINING PROGRAM

## 2019-03-21 RX ORDER — SODIUM CHLORIDE, SODIUM LACTATE, POTASSIUM CHLORIDE, CALCIUM CHLORIDE 600; 310; 30; 20 MG/100ML; MG/100ML; MG/100ML; MG/100ML
INJECTION, SOLUTION INTRAVENOUS CONTINUOUS
Status: DISCONTINUED | OUTPATIENT
Start: 2019-03-21 | End: 2019-03-21

## 2019-03-21 RX ORDER — ACETAMINOPHEN 500 MG
1000 TABLET ORAL ONCE AS NEEDED
Status: DISCONTINUED | OUTPATIENT
Start: 2019-03-21 | End: 2019-03-21

## 2019-03-21 RX ORDER — METOCLOPRAMIDE HYDROCHLORIDE 5 MG/ML
10 INJECTION INTRAMUSCULAR; INTRAVENOUS AS NEEDED
Status: DISCONTINUED | OUTPATIENT
Start: 2019-03-21 | End: 2019-03-21

## 2019-03-21 RX ORDER — MIDAZOLAM HYDROCHLORIDE 1 MG/ML
1 INJECTION INTRAMUSCULAR; INTRAVENOUS EVERY 5 MIN PRN
Status: DISCONTINUED | OUTPATIENT
Start: 2019-03-21 | End: 2019-03-21

## 2019-03-21 RX ORDER — ACETAMINOPHEN 500 MG
1000 TABLET ORAL ONCE
Status: DISCONTINUED | OUTPATIENT
Start: 2019-03-21 | End: 2019-03-21

## 2019-03-21 RX ORDER — ACETAMINOPHEN 500 MG
1000 TABLET ORAL ONCE
COMMUNITY
End: 2019-05-13

## 2019-03-21 RX ORDER — NALOXONE HYDROCHLORIDE 0.4 MG/ML
80 INJECTION, SOLUTION INTRAMUSCULAR; INTRAVENOUS; SUBCUTANEOUS AS NEEDED
Status: DISCONTINUED | OUTPATIENT
Start: 2019-03-21 | End: 2019-03-21

## 2019-03-21 RX ORDER — DIPHENHYDRAMINE HYDROCHLORIDE 50 MG/ML
12.5 INJECTION INTRAMUSCULAR; INTRAVENOUS AS NEEDED
Status: DISCONTINUED | OUTPATIENT
Start: 2019-03-21 | End: 2019-03-21

## 2019-03-21 RX ORDER — ONDANSETRON 2 MG/ML
4 INJECTION INTRAMUSCULAR; INTRAVENOUS AS NEEDED
Status: DISCONTINUED | OUTPATIENT
Start: 2019-03-21 | End: 2019-03-21

## 2019-03-21 NOTE — OPERATIVE REPORT
[facility]  Operative Report    Patient: Lora Durant  YOB: 1980  MRN: [id number]    Procedure: suction dilation and curettage  Date of procedure: 19    Pre op diagnosis: missed , advanced maternal age, hx of miscarria performed once in a clockwise fashion around the uterus. The suction curette was passed again. Sharp curettage was performed once more until a gritty texture was noted with additional tissue to be sent to pathology.  The suction curette was then passed one

## 2019-03-21 NOTE — ANESTHESIA PREPROCEDURE EVALUATION
PRE-OP EVALUATION    Patient Name: Alfred Maharaj    Pre-op Diagnosis: MISSED     Procedure(s):  SUCTION DILATION AND CURETTAGE    Surgeon(s) and Role:     Yeni Vaca MD - Primary    Pre-op vitals reviewed.   Temp: 99.3 °F (37.4 °C)  Puls Past Surgical History:   Procedure Laterality Date   • D&C OF CERVIX STUMP  01/18/2016    for miscarriage   • OTHER      In Vitro Fertilization   • SKIN GRAFT PROCEDURE  2010    Following mole removal     Social History    Tobacco

## 2019-03-21 NOTE — ANESTHESIA POSTPROCEDURE EVALUATION
[facility] [place] Patient Status:  Hospital Outpatient Surgery   Age/Gender 46year old female MRN [id number]   [facility] SURGERY Attending Christian Barroso MD   Hosp Day # 0 PCP Tangela Bueno MD       Anesthesia Post-op

## 2019-03-22 ENCOUNTER — TELEPHONE (OUTPATIENT)
Dept: FAMILY MEDICINE CLINIC | Facility: CLINIC | Age: 39
End: 2019-03-22

## 2019-03-22 NOTE — TELEPHONE ENCOUNTER
Please enter lab orders for the patient's upcoming physical appointment. Physical scheduled: 4/15/19     Preferred lab: [facility]      The patient has been notified to complete fasting labs prior to their physical appointment.

## 2019-03-26 ENCOUNTER — TELEPHONE (OUTPATIENT)
Dept: OBGYN UNIT | Facility: HOSPITAL | Age: 39
End: 2019-03-26

## 2019-05-13 ENCOUNTER — OFFICE VISIT (OUTPATIENT)
Dept: FAMILY MEDICINE CLINIC | Facility: CLINIC | Age: 39
End: 2019-05-13
Payer: COMMERCIAL

## 2019-05-13 VITALS
RESPIRATION RATE: 16 BRPM | DIASTOLIC BLOOD PRESSURE: 80 MMHG | SYSTOLIC BLOOD PRESSURE: 104 MMHG | HEART RATE: 72 BPM | TEMPERATURE: 98 F | HEIGHT: 66.54 IN | BODY MASS INDEX: 23.03 KG/M2 | WEIGHT: 145 LBS

## 2019-05-13 DIAGNOSIS — Z00.00 ROUTINE GENERAL MEDICAL EXAMINATION AT A HEALTH CARE FACILITY: Primary | ICD-10-CM

## 2019-05-13 PROCEDURE — 99395 PREV VISIT EST AGE 18-39: CPT | Performed by: FAMILY MEDICINE

## 2019-05-13 NOTE — PROGRESS NOTES
HPI:   Haile Wheeler is a 46year old female who presents for a complete physical exam.  Last pap:  See GYN - 2/2018 - normal per patient. [facility].   Last mammogram:  No previous, no fhx of breast    Contraception:  Condoms,  ma Grandfather       Social History:   Social History    Tobacco Use      Smoking status: Never Smoker      Smokeless tobacco: Never Used    Alcohol use:  Yes      Alcohol/week: 0.0 oz      Comment: 1 drink daily    Drug use: No       REVIEW OF SYSTEMS:   GENE

## 2021-03-05 ENCOUNTER — LABORATORY ENCOUNTER (OUTPATIENT)
Dept: LAB | Age: 41
End: 2021-03-05
Attending: FAMILY MEDICINE
Payer: COMMERCIAL

## 2021-03-05 DIAGNOSIS — Z00.00 ROUTINE GENERAL MEDICAL EXAMINATION AT A HEALTH CARE FACILITY: ICD-10-CM

## 2021-03-05 LAB
ALBUMIN SERPL-MCNC: 4 G/DL (ref 3.4–5)
ALBUMIN/GLOB SERPL: 1.2 {RATIO} (ref 1–2)
ALP LIVER SERPL-CCNC: 45 U/L
ALT SERPL-CCNC: 26 U/L
ANION GAP SERPL CALC-SCNC: 8 MMOL/L (ref 0–18)
AST SERPL-CCNC: 18 U/L (ref 15–37)
BILIRUB SERPL-MCNC: 0.5 MG/DL (ref 0.1–2)
BUN BLD-MCNC: 20 MG/DL (ref 7–18)
BUN/CREAT SERPL: 20.4 (ref 10–20)
CALCIUM BLD-MCNC: 8.7 MG/DL (ref 8.5–10.1)
CHLORIDE SERPL-SCNC: 104 MMOL/L (ref 98–112)
CHOLEST SMN-MCNC: 226 MG/DL (ref ?–200)
CO2 SERPL-SCNC: 27 MMOL/L (ref 21–32)
CREAT BLD-MCNC: 0.98 MG/DL
DEPRECATED RDW RBC AUTO: 43.9 FL (ref 35.1–46.3)
ERYTHROCYTE [DISTWIDTH] IN BLOOD BY AUTOMATED COUNT: 12.8 % (ref 11–15)
GLOBULIN PLAS-MCNC: 3.4 G/DL (ref 2.8–4.4)
GLUCOSE BLD-MCNC: 78 MG/DL (ref 70–99)
HCT VFR BLD AUTO: 37.3 %
HDLC SERPL-MCNC: 119 MG/DL (ref 40–59)
HGB BLD-MCNC: 12.2 G/DL
LDLC SERPL CALC-MCNC: 99 MG/DL (ref ?–100)
M PROTEIN MFR SERPL ELPH: 7.4 G/DL (ref 6.4–8.2)
MCH RBC QN AUTO: 30.5 PG (ref 26–34)
MCHC RBC AUTO-ENTMCNC: 32.7 G/DL (ref 31–37)
MCV RBC AUTO: 93.3 FL
NONHDLC SERPL-MCNC: 107 MG/DL (ref ?–130)
OSMOLALITY SERPL CALC.SUM OF ELEC: 289 MOSM/KG (ref 275–295)
PATIENT FASTING Y/N/NP: YES
PATIENT FASTING Y/N/NP: YES
PLATELET # BLD AUTO: 197 10(3)UL (ref 150–450)
POTASSIUM SERPL-SCNC: 3.8 MMOL/L (ref 3.5–5.1)
RBC # BLD AUTO: 4 X10(6)UL
SODIUM SERPL-SCNC: 139 MMOL/L (ref 136–145)
TRIGL SERPL-MCNC: 39 MG/DL (ref 30–149)
TSI SER-ACNC: 1.47 MIU/ML (ref 0.36–3.74)
VIT D+METAB SERPL-MCNC: 41.6 NG/ML (ref 30–100)
VLDLC SERPL CALC-MCNC: 8 MG/DL (ref 0–30)
WBC # BLD AUTO: 4.7 X10(3) UL (ref 4–11)

## 2021-03-05 PROCEDURE — 80053 COMPREHEN METABOLIC PANEL: CPT | Performed by: FAMILY MEDICINE

## 2021-03-05 PROCEDURE — 80061 LIPID PANEL: CPT | Performed by: FAMILY MEDICINE

## 2021-03-05 PROCEDURE — 36415 COLL VENOUS BLD VENIPUNCTURE: CPT | Performed by: FAMILY MEDICINE

## 2021-03-05 PROCEDURE — 82306 VITAMIN D 25 HYDROXY: CPT | Performed by: FAMILY MEDICINE

## 2021-03-05 PROCEDURE — 84443 ASSAY THYROID STIM HORMONE: CPT | Performed by: FAMILY MEDICINE

## 2021-03-05 PROCEDURE — 85027 COMPLETE CBC AUTOMATED: CPT | Performed by: FAMILY MEDICINE

## 2021-03-26 ENCOUNTER — OFFICE VISIT (OUTPATIENT)
Dept: FAMILY MEDICINE CLINIC | Facility: CLINIC | Age: 41
End: 2021-03-26
Payer: COMMERCIAL

## 2021-03-26 VITALS
WEIGHT: 147.63 LBS | HEART RATE: 74 BPM | DIASTOLIC BLOOD PRESSURE: 62 MMHG | SYSTOLIC BLOOD PRESSURE: 112 MMHG | TEMPERATURE: 97 F | RESPIRATION RATE: 17 BRPM | HEIGHT: 66.14 IN | BODY MASS INDEX: 23.72 KG/M2

## 2021-03-26 DIAGNOSIS — Z00.00 ROUTINE GENERAL MEDICAL EXAMINATION AT A HEALTH CARE FACILITY: Primary | ICD-10-CM

## 2021-03-26 DIAGNOSIS — Z12.31 VISIT FOR SCREENING MAMMOGRAM: ICD-10-CM

## 2021-03-26 DIAGNOSIS — Z12.4 SCREENING FOR CERVICAL CANCER: ICD-10-CM

## 2021-03-26 PROCEDURE — 3008F BODY MASS INDEX DOCD: CPT | Performed by: FAMILY MEDICINE

## 2021-03-26 PROCEDURE — 88175 CYTOPATH C/V AUTO FLUID REDO: CPT | Performed by: FAMILY MEDICINE

## 2021-03-26 PROCEDURE — 99396 PREV VISIT EST AGE 40-64: CPT | Performed by: FAMILY MEDICINE

## 2021-03-26 PROCEDURE — 3074F SYST BP LT 130 MM HG: CPT | Performed by: FAMILY MEDICINE

## 2021-03-26 PROCEDURE — 3078F DIAST BP <80 MM HG: CPT | Performed by: FAMILY MEDICINE

## 2021-03-26 PROCEDURE — 87624 HPV HI-RISK TYP POOLED RSLT: CPT | Performed by: FAMILY MEDICINE

## 2021-03-26 NOTE — PROGRESS NOTES
HPI:   Cristiana Romero is a 37year old female who presents for a complete physical exam.  Last pap: Done today 3/26/2021  Last mammogram: Ordered  Contraception:  Condoms,  may get vasectomy  Previous colonoscopy:  No previous  Previous DEXA:  /. • Heart Disease Maternal Grandfather    • Heart Attack Maternal Grandfather       Social History:   Social History    Tobacco Use      Smoking status: Never Smoker      Smokeless tobacco: Never Used    Vaping Use      Vaping Use: Never used    Alcohol us adnexa in size, nontender and no masses  EXTREMITIES: no edema    ASSESSMENT AND PLAN:   Michel Barnhart is a 37year old female who presents for a complete physical exam.  Routine general medical examination at a health care facility  (primary encounter

## 2021-03-29 LAB — HPV I/H RISK 1 DNA SPEC QL NAA+PROBE: NEGATIVE

## 2021-04-17 ENCOUNTER — HOSPITAL ENCOUNTER (OUTPATIENT)
Age: 41
Discharge: HOME | End: 2021-04-17
Payer: COMMERCIAL

## 2021-04-17 VITALS
RESPIRATION RATE: 16 BRPM | BODY MASS INDEX: 22.76 KG/M2 | DIASTOLIC BLOOD PRESSURE: 93 MMHG | HEART RATE: 73 BPM | SYSTOLIC BLOOD PRESSURE: 131 MMHG | OXYGEN SATURATION: 99 % | WEIGHT: 145 LBS | TEMPERATURE: 98 F | HEIGHT: 67 IN

## 2021-04-17 DIAGNOSIS — Z20.822 EXPOSURE TO COVID-19 VIRUS: ICD-10-CM

## 2021-04-17 DIAGNOSIS — Z20.822 LAB TEST NEGATIVE FOR COVID-19 VIRUS: Primary | ICD-10-CM

## 2021-04-17 PROCEDURE — U0002 COVID-19 LAB TEST NON-CDC: HCPCS | Performed by: NURSE PRACTITIONER

## 2021-04-17 PROCEDURE — 99212 OFFICE O/P EST SF 10 MIN: CPT | Performed by: NURSE PRACTITIONER

## 2021-04-17 NOTE — ED PROVIDER NOTES
Patient Seen in: Immediate [address] [place] [address]      History   Patient presents with:  Covid-19 Test: Rapid and PCR please; my daughter has had a close contact.  we are both coming. - Entered by patient    Stated Complaint: Covid-19 Test    HPI/Subjectiv Temporal   SpO2 04/17/21 1533 99 %   O2 Device 04/17/21 1533 None (Room air)       Current:BP (!) 131/93   Pulse 73   Temp 98.3 °F (36.8 °C) (Temporal)   Resp 16   Ht 170.2 cm (5' 7\")   Wt 65.8 kg   LMP 04/09/2021 (Exact Date)   SpO2 99%   BMI 22.71 kg/m² Labs Reviewed   RAPID SARS-COV-2 BY PCR - Normal                     MDM       Covid test negative  Result discussed with parent.                                    Disposition and Plan     Clinical Impression:  Lab test negative for COVID-19 virus  (pr

## 2021-04-17 NOTE — ED INITIAL ASSESSMENT (HPI)
Pt c/o here for COVID-19 testing after her daughter was exposed to someone who tested positive for COVID-19. Pt states she has been sneezing today but denies other s/s.

## 2021-05-28 ENCOUNTER — HOSPITAL ENCOUNTER (OUTPATIENT)
Dept: MAMMOGRAPHY | Age: 41
Discharge: HOME | End: 2021-05-28
Attending: FAMILY MEDICINE
Payer: COMMERCIAL

## 2021-05-28 DIAGNOSIS — Z12.31 VISIT FOR SCREENING MAMMOGRAM: ICD-10-CM

## 2021-05-28 PROCEDURE — 77063 BREAST TOMOSYNTHESIS BI: CPT | Performed by: FAMILY MEDICINE

## 2021-05-28 PROCEDURE — 77067 SCR MAMMO BI INCL CAD: CPT | Performed by: FAMILY MEDICINE

## 2021-10-31 ENCOUNTER — HOSPITAL ENCOUNTER (OUTPATIENT)
Age: 41
Discharge: HOME | End: 2021-10-31
Payer: COMMERCIAL

## 2021-10-31 VITALS
DIASTOLIC BLOOD PRESSURE: 76 MMHG | TEMPERATURE: 99 F | SYSTOLIC BLOOD PRESSURE: 135 MMHG | OXYGEN SATURATION: 100 % | HEART RATE: 56 BPM | RESPIRATION RATE: 18 BRPM

## 2021-10-31 DIAGNOSIS — Z20.822 ENCOUNTER FOR LABORATORY TESTING FOR COVID-19 VIRUS: Primary | ICD-10-CM

## 2021-10-31 PROCEDURE — 99212 OFFICE O/P EST SF 10 MIN: CPT | Performed by: NURSE PRACTITIONER

## 2021-10-31 PROCEDURE — U0002 COVID-19 LAB TEST NON-CDC: HCPCS | Performed by: NURSE PRACTITIONER

## 2021-10-31 NOTE — ED PROVIDER NOTES
Patient Seen in: Immediate [address]      History   Patient presents with:  Runny Nose    Stated Complaint: covid test- runny nose     Subjective:   Patient presents to immediate care for COVID testing.   Patient reports cold symptoms over the (Temporal)   Resp 18   LMP 10/15/2021   SpO2 100%         Physical Exam  Constitutional:       Appearance: Normal appearance. HENT:      Head: Normocephalic.       Nose: Nose normal.      Mouth/Throat:      Mouth: Mucous membranes are moist.   Eyes:

## 2021-11-08 PROBLEM — F32.81 PMDD (PREMENSTRUAL DYSPHORIC DISORDER): Status: ACTIVE | Noted: 2021-11-08

## 2021-11-08 NOTE — PROGRESS NOTES
Subjective     HPI:   Gracie Oliva is a 37year old female. Reason for video visit:  Mood irritability  This visit is conducted using Telemedicine with live, interactive video and audio.     Noticing increase in mood fluctuation with menstrual cycle

## 2022-01-27 ENCOUNTER — PATIENT MESSAGE (OUTPATIENT)
Dept: FAMILY MEDICINE CLINIC | Facility: CLINIC | Age: 42
End: 2022-01-27

## 2022-01-27 RX ORDER — FLUOXETINE HYDROCHLORIDE 20 MG/1
20 CAPSULE ORAL DAILY
Qty: 90 CAPSULE | Refills: 0 | Status: SHIPPED | OUTPATIENT
Start: 2022-01-27

## 2022-01-27 NOTE — TELEPHONE ENCOUNTER
From: Maximus Wade  To: Angelo Renee MD  Sent: 1/27/2022 12:23 PM CST  Subject: Medication update/refill    Hi Dr. Rickie Gutierrez,    I hope you’re doing well and staying healthy. So far, so good over here.     It seems hard to believe, but I have just over

## 2022-01-27 NOTE — TELEPHONE ENCOUNTER
Telemed visit 11/8/2021 with a note by  to update her in 3 months. She has just over a week of Prozac left.    Routed to

## 2022-01-28 ENCOUNTER — TELEPHONE (OUTPATIENT)
Dept: FAMILY MEDICINE CLINIC | Facility: CLINIC | Age: 42
End: 2022-01-28

## 2022-01-28 DIAGNOSIS — Z00.00 ROUTINE GENERAL MEDICAL EXAMINATION AT A HEALTH CARE FACILITY: Primary | ICD-10-CM

## 2022-01-28 NOTE — TELEPHONE ENCOUNTER
Please enter lab orders for the patient's upcoming physical appointment. Physical scheduled:    Your appointments     Date & Time Appointment Department [facility])    Apr 19, 2022  9:30 AM CDT Adult Physical with China Rojo MD [facility],

## 2022-01-31 RX ORDER — FLUOXETINE 10 MG/1
10 TABLET, FILM COATED ORAL DAILY
Qty: 90 TABLET | Refills: 0 | OUTPATIENT
Start: 2022-01-31

## 2022-04-20 ENCOUNTER — LAB ENCOUNTER (OUTPATIENT)
Dept: LAB | Age: 42
End: 2022-04-20
Attending: FAMILY MEDICINE
Payer: COMMERCIAL

## 2022-04-20 DIAGNOSIS — Z00.00 ROUTINE GENERAL MEDICAL EXAMINATION AT A HEALTH CARE FACILITY: ICD-10-CM

## 2022-04-20 LAB
ALBUMIN SERPL-MCNC: 3.8 G/DL (ref 3.4–5)
ALBUMIN/GLOB SERPL: 1.4 {RATIO} (ref 1–2)
ALP LIVER SERPL-CCNC: 41 U/L
ALT SERPL-CCNC: 21 U/L
ANION GAP SERPL CALC-SCNC: 6 MMOL/L (ref 0–18)
AST SERPL-CCNC: 15 U/L (ref 15–37)
BILIRUB SERPL-MCNC: 0.5 MG/DL (ref 0.1–2)
BUN BLD-MCNC: 10 MG/DL (ref 7–18)
CALCIUM BLD-MCNC: 8.6 MG/DL (ref 8.5–10.1)
CHLORIDE SERPL-SCNC: 107 MMOL/L (ref 98–112)
CHOLEST SERPL-MCNC: 192 MG/DL (ref ?–200)
CO2 SERPL-SCNC: 28 MMOL/L (ref 21–32)
CREAT BLD-MCNC: 0.99 MG/DL
ERYTHROCYTE [DISTWIDTH] IN BLOOD BY AUTOMATED COUNT: 12.8 %
FASTING PATIENT LIPID ANSWER: YES
FASTING STATUS PATIENT QL REPORTED: YES
GLOBULIN PLAS-MCNC: 2.8 G/DL (ref 2.8–4.4)
GLUCOSE BLD-MCNC: 86 MG/DL (ref 70–99)
HCT VFR BLD AUTO: 37.8 %
HDLC SERPL-MCNC: 100 MG/DL (ref 40–59)
HGB BLD-MCNC: 12.4 G/DL
LDLC SERPL CALC-MCNC: 84 MG/DL (ref ?–100)
MCH RBC QN AUTO: 31.8 PG (ref 26–34)
MCHC RBC AUTO-ENTMCNC: 32.8 G/DL (ref 31–37)
MCV RBC AUTO: 96.9 FL
NONHDLC SERPL-MCNC: 92 MG/DL (ref ?–130)
OSMOLALITY SERPL CALC.SUM OF ELEC: 290 MOSM/KG (ref 275–295)
PLATELET # BLD AUTO: 193 10(3)UL (ref 150–450)
POTASSIUM SERPL-SCNC: 4.1 MMOL/L (ref 3.5–5.1)
PROT SERPL-MCNC: 6.6 G/DL (ref 6.4–8.2)
RBC # BLD AUTO: 3.9 X10(6)UL
SODIUM SERPL-SCNC: 141 MMOL/L (ref 136–145)
TRIGL SERPL-MCNC: 38 MG/DL (ref 30–149)
TSI SER-ACNC: 1.18 MIU/ML (ref 0.36–3.74)
VIT D+METAB SERPL-MCNC: 33.9 NG/ML (ref 30–100)
VLDLC SERPL CALC-MCNC: 6 MG/DL (ref 0–30)
WBC # BLD AUTO: 3.5 X10(3) UL (ref 4–11)

## 2022-04-20 PROCEDURE — 82306 VITAMIN D 25 HYDROXY: CPT

## 2022-04-20 PROCEDURE — 84443 ASSAY THYROID STIM HORMONE: CPT

## 2022-04-20 PROCEDURE — 80061 LIPID PANEL: CPT

## 2022-04-20 PROCEDURE — 36415 COLL VENOUS BLD VENIPUNCTURE: CPT

## 2022-04-20 PROCEDURE — 85027 COMPLETE CBC AUTOMATED: CPT

## 2022-04-20 PROCEDURE — 80053 COMPREHEN METABOLIC PANEL: CPT

## 2022-05-03 ENCOUNTER — OFFICE VISIT (OUTPATIENT)
Dept: FAMILY MEDICINE CLINIC | Facility: CLINIC | Age: 42
End: 2022-05-03
Payer: COMMERCIAL

## 2022-05-03 VITALS
SYSTOLIC BLOOD PRESSURE: 118 MMHG | HEIGHT: 66 IN | TEMPERATURE: 98 F | DIASTOLIC BLOOD PRESSURE: 70 MMHG | RESPIRATION RATE: 16 BRPM | WEIGHT: 145.19 LBS | BODY MASS INDEX: 23.33 KG/M2 | HEART RATE: 66 BPM

## 2022-05-03 DIAGNOSIS — Z00.00 ROUTINE GENERAL MEDICAL EXAMINATION AT A HEALTH CARE FACILITY: Primary | ICD-10-CM

## 2022-05-03 DIAGNOSIS — R79.89 ABNORMAL CBC: ICD-10-CM

## 2022-05-03 DIAGNOSIS — Z12.31 VISIT FOR SCREENING MAMMOGRAM: ICD-10-CM

## 2022-05-03 PROCEDURE — 3008F BODY MASS INDEX DOCD: CPT | Performed by: FAMILY MEDICINE

## 2022-05-03 PROCEDURE — 3074F SYST BP LT 130 MM HG: CPT | Performed by: FAMILY MEDICINE

## 2022-05-03 PROCEDURE — 99396 PREV VISIT EST AGE 40-64: CPT | Performed by: FAMILY MEDICINE

## 2022-05-03 PROCEDURE — 3078F DIAST BP <80 MM HG: CPT | Performed by: FAMILY MEDICINE

## 2022-05-03 RX ORDER — FLUOXETINE HYDROCHLORIDE 20 MG/1
20 CAPSULE ORAL DAILY
Qty: 90 CAPSULE | Refills: 3 | Status: SHIPPED | OUTPATIENT
Start: 2022-05-03

## 2022-06-23 ENCOUNTER — HOSPITAL ENCOUNTER (OUTPATIENT)
Dept: MAMMOGRAPHY | Age: 42
Discharge: HOME | End: 2022-06-23
Attending: FAMILY MEDICINE
Payer: COMMERCIAL

## 2022-06-23 DIAGNOSIS — Z12.31 VISIT FOR SCREENING MAMMOGRAM: ICD-10-CM

## 2022-06-23 PROCEDURE — 77067 SCR MAMMO BI INCL CAD: CPT | Performed by: FAMILY MEDICINE

## 2022-06-23 PROCEDURE — 77063 BREAST TOMOSYNTHESIS BI: CPT | Performed by: FAMILY MEDICINE

## 2022-10-18 ENCOUNTER — PATIENT MESSAGE (OUTPATIENT)
Dept: FAMILY MEDICINE CLINIC | Facility: CLINIC | Age: 42
End: 2022-10-18

## 2022-10-24 ENCOUNTER — IMMUNIZATION (OUTPATIENT)
Dept: FAMILY MEDICINE CLINIC | Facility: CLINIC | Age: 42
End: 2022-10-24
Payer: COMMERCIAL

## 2022-10-24 DIAGNOSIS — Z23 NEED FOR VACCINATION: Primary | ICD-10-CM

## 2022-11-25 ENCOUNTER — PATIENT MESSAGE (OUTPATIENT)
Dept: FAMILY MEDICINE CLINIC | Facility: CLINIC | Age: 42
End: 2022-11-25

## 2022-11-25 RX ORDER — POLYMYXIN B SULFATE AND TRIMETHOPRIM 1; 10000 MG/ML; [USP'U]/ML
2 SOLUTION OPHTHALMIC EVERY 4 HOURS
Qty: 10 ML | Refills: 1 | Status: SHIPPED | OUTPATIENT
Start: 2022-11-25

## 2023-01-17 ENCOUNTER — PATIENT MESSAGE (OUTPATIENT)
Dept: FAMILY MEDICINE CLINIC | Facility: CLINIC | Age: 43
End: 2023-01-17

## 2023-01-17 DIAGNOSIS — Z13.1 SCREENING FOR DIABETES MELLITUS: ICD-10-CM

## 2023-01-17 DIAGNOSIS — Z13.29 SCREENING FOR ENDOCRINE, METABOLIC AND IMMUNITY DISORDER: ICD-10-CM

## 2023-01-17 DIAGNOSIS — Z13.228 SCREENING FOR ENDOCRINE, METABOLIC AND IMMUNITY DISORDER: ICD-10-CM

## 2023-01-17 DIAGNOSIS — Z13.220 SCREENING, LIPID: ICD-10-CM

## 2023-01-17 DIAGNOSIS — Z00.00 ROUTINE GENERAL MEDICAL EXAMINATION AT A HEALTH CARE FACILITY: ICD-10-CM

## 2023-01-17 DIAGNOSIS — Z13.0 SCREENING, ANEMIA, DEFICIENCY, IRON: Primary | ICD-10-CM

## 2023-01-17 DIAGNOSIS — Z13.29 SCREENING FOR THYROID DISORDER: ICD-10-CM

## 2023-01-17 DIAGNOSIS — Z13.21 ENCOUNTER FOR VITAMIN DEFICIENCY SCREENING: ICD-10-CM

## 2023-01-17 DIAGNOSIS — Z13.0 SCREENING FOR ENDOCRINE, METABOLIC AND IMMUNITY DISORDER: ICD-10-CM

## 2023-01-18 NOTE — TELEPHONE ENCOUNTER
From: Christopher Kimbrough  To: Alyssa Leavitt MD  Sent: 1/17/2023 8:24 PM CST  Subject: Lab order    Hi! Just scheduled my annual physical for 5/8. Can you order bloodwork for me so I can get it done in the next couple of months/prior to the appt? Thanks!   formerly [facility]

## 2023-04-18 ENCOUNTER — LAB ENCOUNTER (OUTPATIENT)
Dept: LAB | Age: 43
End: 2023-04-18
Attending: FAMILY MEDICINE
Payer: COMMERCIAL

## 2023-04-18 DIAGNOSIS — Z00.00 ROUTINE GENERAL MEDICAL EXAMINATION AT A HEALTH CARE FACILITY: ICD-10-CM

## 2023-04-18 LAB
ALBUMIN SERPL-MCNC: 3.6 G/DL (ref 3.4–5)
ALBUMIN/GLOB SERPL: 1.1 {RATIO} (ref 1–2)
ALP LIVER SERPL-CCNC: 53 U/L
ALT SERPL-CCNC: 28 U/L
ANION GAP SERPL CALC-SCNC: 1 MMOL/L (ref 0–18)
AST SERPL-CCNC: 24 U/L (ref 15–37)
BILIRUB SERPL-MCNC: 0.5 MG/DL (ref 0.1–2)
BUN BLD-MCNC: 15 MG/DL (ref 7–18)
CALCIUM BLD-MCNC: 8.5 MG/DL (ref 8.5–10.1)
CHLORIDE SERPL-SCNC: 107 MMOL/L (ref 98–112)
CHOLEST SERPL-MCNC: 190 MG/DL (ref ?–200)
CO2 SERPL-SCNC: 29 MMOL/L (ref 21–32)
CREAT BLD-MCNC: 0.99 MG/DL
ERYTHROCYTE [DISTWIDTH] IN BLOOD BY AUTOMATED COUNT: 12.8 %
FASTING PATIENT LIPID ANSWER: YES
FASTING STATUS PATIENT QL REPORTED: YES
GFR SERPLBLD BASED ON 1.73 SQ M-ARVRAT: 73 ML/MIN/1.73M2 (ref 60–?)
GLOBULIN PLAS-MCNC: 3.4 G/DL (ref 2.8–4.4)
GLUCOSE BLD-MCNC: 93 MG/DL (ref 70–99)
HCT VFR BLD AUTO: 37.6 %
HDLC SERPL-MCNC: 101 MG/DL (ref 40–59)
HGB BLD-MCNC: 12.3 G/DL
LDLC SERPL CALC-MCNC: 79 MG/DL (ref ?–100)
MCH RBC QN AUTO: 30.4 PG (ref 26–34)
MCHC RBC AUTO-ENTMCNC: 32.7 G/DL (ref 31–37)
MCV RBC AUTO: 93.1 FL
NONHDLC SERPL-MCNC: 89 MG/DL (ref ?–130)
OSMOLALITY SERPL CALC.SUM OF ELEC: 285 MOSM/KG (ref 275–295)
PLATELET # BLD AUTO: 187 10(3)UL (ref 150–450)
POTASSIUM SERPL-SCNC: 3.9 MMOL/L (ref 3.5–5.1)
PROT SERPL-MCNC: 7 G/DL (ref 6.4–8.2)
RBC # BLD AUTO: 4.04 X10(6)UL
SODIUM SERPL-SCNC: 137 MMOL/L (ref 136–145)
TRIGL SERPL-MCNC: 52 MG/DL (ref 30–149)
TSI SER-ACNC: 1.51 MIU/ML (ref 0.36–3.74)
VIT D+METAB SERPL-MCNC: 29.5 NG/ML (ref 30–100)
VLDLC SERPL CALC-MCNC: 8 MG/DL (ref 0–30)
WBC # BLD AUTO: 3 X10(3) UL (ref 4–11)

## 2023-04-18 PROCEDURE — 84443 ASSAY THYROID STIM HORMONE: CPT

## 2023-04-18 PROCEDURE — 82306 VITAMIN D 25 HYDROXY: CPT

## 2023-04-18 PROCEDURE — 85027 COMPLETE CBC AUTOMATED: CPT

## 2023-04-18 PROCEDURE — 36415 COLL VENOUS BLD VENIPUNCTURE: CPT

## 2023-04-18 PROCEDURE — 80053 COMPREHEN METABOLIC PANEL: CPT

## 2023-04-18 PROCEDURE — 80061 LIPID PANEL: CPT

## 2023-04-26 RX ORDER — FLUOXETINE HYDROCHLORIDE 20 MG/1
CAPSULE ORAL
Qty: 90 CAPSULE | Refills: 3 | Status: SHIPPED | OUTPATIENT
Start: 2023-04-26

## 2023-05-08 ENCOUNTER — LAB ENCOUNTER (OUTPATIENT)
Dept: LAB | Age: 43
End: 2023-05-08
Attending: FAMILY MEDICINE
Payer: COMMERCIAL

## 2023-05-08 ENCOUNTER — OFFICE VISIT (OUTPATIENT)
Dept: FAMILY MEDICINE CLINIC | Facility: CLINIC | Age: 43
End: 2023-05-08
Payer: COMMERCIAL

## 2023-05-08 VITALS
SYSTOLIC BLOOD PRESSURE: 104 MMHG | HEIGHT: 66 IN | HEART RATE: 77 BPM | BODY MASS INDEX: 24.88 KG/M2 | WEIGHT: 154.81 LBS | RESPIRATION RATE: 16 BRPM | TEMPERATURE: 99 F | DIASTOLIC BLOOD PRESSURE: 70 MMHG

## 2023-05-08 DIAGNOSIS — Z12.31 VISIT FOR SCREENING MAMMOGRAM: ICD-10-CM

## 2023-05-08 DIAGNOSIS — D72.819 LEUKOPENIA, UNSPECIFIED TYPE: ICD-10-CM

## 2023-05-08 DIAGNOSIS — Z00.00 ROUTINE GENERAL MEDICAL EXAMINATION AT A HEALTH CARE FACILITY: Primary | ICD-10-CM

## 2023-05-08 LAB
BASOPHILS # BLD AUTO: 0.04 X10(3) UL (ref 0–0.2)
BASOPHILS NFR BLD AUTO: 0.7 %
EOSINOPHIL # BLD AUTO: 0.04 X10(3) UL (ref 0–0.7)
EOSINOPHIL NFR BLD AUTO: 0.7 %
ERYTHROCYTE [DISTWIDTH] IN BLOOD BY AUTOMATED COUNT: 12.5 %
HCT VFR BLD AUTO: 38 %
HGB BLD-MCNC: 12.3 G/DL
IMM GRANULOCYTES # BLD AUTO: 0.01 X10(3) UL (ref 0–1)
IMM GRANULOCYTES NFR BLD: 0.2 %
LYMPHOCYTES # BLD AUTO: 1.55 X10(3) UL (ref 1–4)
LYMPHOCYTES NFR BLD AUTO: 28 %
MCH RBC QN AUTO: 30.8 PG (ref 26–34)
MCHC RBC AUTO-ENTMCNC: 32.4 G/DL (ref 31–37)
MCV RBC AUTO: 95.2 FL
MONOCYTES # BLD AUTO: 0.34 X10(3) UL (ref 0.1–1)
MONOCYTES NFR BLD AUTO: 6.1 %
NEUTROPHILS # BLD AUTO: 3.56 X10 (3) UL (ref 1.5–7.7)
NEUTROPHILS # BLD AUTO: 3.56 X10(3) UL (ref 1.5–7.7)
NEUTROPHILS NFR BLD AUTO: 64.3 %
PLATELET # BLD AUTO: 200 10(3)UL (ref 150–450)
RBC # BLD AUTO: 3.99 X10(6)UL
WBC # BLD AUTO: 5.5 X10(3) UL (ref 4–11)

## 2023-05-08 PROCEDURE — 3078F DIAST BP <80 MM HG: CPT | Performed by: FAMILY MEDICINE

## 2023-05-08 PROCEDURE — 3074F SYST BP LT 130 MM HG: CPT | Performed by: FAMILY MEDICINE

## 2023-05-08 PROCEDURE — 99396 PREV VISIT EST AGE 40-64: CPT | Performed by: FAMILY MEDICINE

## 2023-05-08 PROCEDURE — 3008F BODY MASS INDEX DOCD: CPT | Performed by: FAMILY MEDICINE

## 2023-05-08 PROCEDURE — 36415 COLL VENOUS BLD VENIPUNCTURE: CPT

## 2023-05-08 PROCEDURE — 85025 COMPLETE CBC W/AUTO DIFF WBC: CPT

## 2023-05-08 RX ORDER — BUPROPION HYDROCHLORIDE 150 MG/1
150 TABLET ORAL DAILY
Qty: 90 TABLET | Refills: 3 | Status: SHIPPED | OUTPATIENT
Start: 2023-05-08

## 2023-05-08 NOTE — PATIENT INSTRUCTIONS
A heart scan, a CT scan of the heart, is the safest and most accurate screening tool for detecting the early build-up of calcium in the coronary arteries, the most common cause of heart disease. This simple, painless and potentially lifesaving test takes just 15 minutes.     To schedule call [phone]    Heart scan locations:  [address] [place] [address] (enter through the Emergency Dept.)    Make an appointment for a heart scan if you are male over age 36 or a female over age 39, and have one or more of these risk factors:  High blood pressure  High cholesterol  Smoking  Obesity  Diabetes  Family history of heart disease

## 2023-07-18 ENCOUNTER — HOSPITAL ENCOUNTER (OUTPATIENT)
Dept: MAMMOGRAPHY | Age: 43
Discharge: HOME | End: 2023-07-18
Attending: FAMILY MEDICINE
Payer: COMMERCIAL

## 2023-07-18 DIAGNOSIS — Z12.31 VISIT FOR SCREENING MAMMOGRAM: ICD-10-CM

## 2023-07-18 PROCEDURE — 77067 SCR MAMMO BI INCL CAD: CPT | Performed by: FAMILY MEDICINE

## 2023-07-18 PROCEDURE — 77063 BREAST TOMOSYNTHESIS BI: CPT | Performed by: FAMILY MEDICINE

## 2023-08-03 ENCOUNTER — HOSPITAL ENCOUNTER (OUTPATIENT)
Dept: MAMMOGRAPHY | Facility: HOSPITAL | Age: 43
Discharge: HOME | End: 2023-08-03
Attending: FAMILY MEDICINE
Payer: COMMERCIAL

## 2023-08-03 DIAGNOSIS — R92.2 INCONCLUSIVE MAMMOGRAM: ICD-10-CM

## 2023-08-03 PROCEDURE — 76642 ULTRASOUND BREAST LIMITED: CPT | Performed by: FAMILY MEDICINE

## 2023-08-03 PROCEDURE — 77065 DX MAMMO INCL CAD UNI: CPT | Performed by: FAMILY MEDICINE

## 2023-08-03 PROCEDURE — 77061 BREAST TOMOSYNTHESIS UNI: CPT | Performed by: FAMILY MEDICINE

## 2023-08-05 ENCOUNTER — PATIENT MESSAGE (OUTPATIENT)
Dept: FAMILY MEDICINE CLINIC | Facility: CLINIC | Age: 43
End: 2023-08-05

## 2023-08-05 RX ORDER — BUPROPION HYDROCHLORIDE 300 MG/1
300 TABLET ORAL DAILY
Qty: 90 TABLET | Refills: 1 | Status: SHIPPED | OUTPATIENT
Start: 2023-08-05

## 2023-09-29 ENCOUNTER — HOSPITAL ENCOUNTER (OUTPATIENT)
Age: 43
Discharge: HOME | End: 2023-09-29
Payer: COMMERCIAL

## 2023-09-29 VITALS
HEART RATE: 73 BPM | SYSTOLIC BLOOD PRESSURE: 141 MMHG | DIASTOLIC BLOOD PRESSURE: 91 MMHG | TEMPERATURE: 98 F | OXYGEN SATURATION: 99 % | RESPIRATION RATE: 18 BRPM

## 2023-09-29 DIAGNOSIS — S86.111A RUPTURE OF RIGHT GASTROCNEMIUS TENDON, INITIAL ENCOUNTER: Primary | ICD-10-CM

## 2023-09-29 PROCEDURE — A6449 LT COMPRES BAND >=3" <5"/YD: HCPCS | Performed by: PHYSICIAN ASSISTANT

## 2023-09-29 PROCEDURE — 99213 OFFICE O/P EST LOW 20 MIN: CPT | Performed by: PHYSICIAN ASSISTANT

## 2023-09-29 PROCEDURE — E0114 CRUTCH UNDERARM PAIR NO WOOD: HCPCS | Performed by: PHYSICIAN ASSISTANT

## 2023-09-29 RX ORDER — HYDROCODONE BITARTRATE AND ACETAMINOPHEN 5; 325 MG/1; MG/1
1 TABLET ORAL EVERY 6 HOURS PRN
Qty: 10 TABLET | Refills: 0 | Status: SHIPPED | OUTPATIENT
Start: 2023-09-29

## 2023-09-29 NOTE — ED INITIAL ASSESSMENT (HPI)
Patient reports she was playing pickle ball and around 1115 injured something in right calf, feeling something funny and now having hard time with weightbearing/walking/engaging muscle. Denies any fall, reports pain started after planting her foot down.

## 2023-09-29 NOTE — DISCHARGE INSTRUCTIONS
Wear Ace wrap and use crutches for support. You may weight-bear as tolerated. Take Tylenol and ibuprofen as directed. Follow-up with orthopedics. Go to ER with any new or worsening symptoms.

## 2023-09-30 ENCOUNTER — PATIENT MESSAGE (OUTPATIENT)
Dept: FAMILY MEDICINE CLINIC | Facility: CLINIC | Age: 43
End: 2023-09-30

## 2023-09-30 DIAGNOSIS — M79.604 PAIN IN RIGHT LEG: Primary | ICD-10-CM

## 2023-10-02 ENCOUNTER — TELEPHONE (OUTPATIENT)
Dept: ORTHOPEDICS CLINIC | Facility: CLINIC | Age: 43
End: 2023-10-02

## 2023-10-02 DIAGNOSIS — M79.661 RIGHT CALF PAIN: Primary | ICD-10-CM

## 2023-10-02 NOTE — TELEPHONE ENCOUNTER
Patient has an appointment scheduled with Isidro Antoine for RIGHT Calf muscle tear on 10/3/23. No previous imaging taken. Please advise if imaging is needed prior.

## 2023-10-03 ENCOUNTER — HOSPITAL ENCOUNTER (OUTPATIENT)
Dept: GENERAL RADIOLOGY | Age: 43
Discharge: HOME | End: 2023-10-03
Attending: PHYSICIAN ASSISTANT
Payer: COMMERCIAL

## 2023-10-03 ENCOUNTER — OFFICE VISIT (OUTPATIENT)
Dept: ORTHOPEDICS CLINIC | Facility: CLINIC | Age: 43
End: 2023-10-03
Payer: COMMERCIAL

## 2023-10-03 ENCOUNTER — PATIENT MESSAGE (OUTPATIENT)
Dept: ORTHOPEDICS CLINIC | Facility: CLINIC | Age: 43
End: 2023-10-03

## 2023-10-03 VITALS — BODY MASS INDEX: 23.54 KG/M2 | HEIGHT: 67 IN | WEIGHT: 150 LBS

## 2023-10-03 DIAGNOSIS — M79.661 RIGHT CALF PAIN: ICD-10-CM

## 2023-10-03 DIAGNOSIS — S86.111A STRAIN OF RIGHT GASTROCNEMIUS MUSCLE, INITIAL ENCOUNTER: Primary | ICD-10-CM

## 2023-10-03 PROCEDURE — 73590 X-RAY EXAM OF LOWER LEG: CPT | Performed by: PHYSICIAN ASSISTANT

## 2023-10-03 PROCEDURE — 99213 OFFICE O/P EST LOW 20 MIN: CPT | Performed by: PHYSICIAN ASSISTANT

## 2023-10-03 PROCEDURE — 3008F BODY MASS INDEX DOCD: CPT | Performed by: PHYSICIAN ASSISTANT

## 2023-10-03 NOTE — TELEPHONE ENCOUNTER
LOV today. Moved from crutches to CAM boot. Reports pain increased to 3-5 out of 10 dependent on movement. Favoring opposite leg due to pain, reports gait is off. Pt concerned about possibly causing injury to self. Advised pt increase in pain likely normal and not worrisome but will check with Sincer.

## 2023-10-03 NOTE — TELEPHONE ENCOUNTER
From: Angel Ivey  To: Sincer Caren Win  Sent: 10/3/2023 11:05 AM CDT  Subject: Pain in walking boot    Hi Sincer,    I just wanted to follow up; once I got the boot on, I think I was expecting walking to feel close to normal, but I am still experiencing quite a bit of pain when I try to put weight on my leg. Is this normal? Should I back off and go back to crutches for a bit longer or is some pain/discomfort in the walking boot ok and safe? I just want to be sure I'm not overdoing it or causing more injury.     Thanks,  [place] [employer]

## 2023-10-17 ENCOUNTER — TELEPHONE (OUTPATIENT)
Dept: PHYSICAL THERAPY | Facility: HOSPITAL | Age: 43
End: 2023-10-17

## 2023-10-18 ENCOUNTER — OFFICE VISIT (OUTPATIENT)
Dept: PHYSICAL THERAPY | Age: 43
End: 2023-10-18
Attending: PHYSICIAN ASSISTANT
Payer: COMMERCIAL

## 2023-10-18 DIAGNOSIS — S86.111A STRAIN OF RIGHT GASTROCNEMIUS MUSCLE, INITIAL ENCOUNTER: Primary | ICD-10-CM

## 2023-10-18 PROCEDURE — 97110 THERAPEUTIC EXERCISES: CPT

## 2023-10-18 PROCEDURE — 97161 PT EVAL LOW COMPLEX 20 MIN: CPT

## 2023-10-23 ENCOUNTER — IMMUNIZATION (OUTPATIENT)
Dept: LAB | Age: 43
End: 2023-10-23
Attending: EMERGENCY MEDICINE
Payer: COMMERCIAL

## 2023-10-23 DIAGNOSIS — Z23 NEED FOR VACCINATION: Primary | ICD-10-CM

## 2023-10-23 PROCEDURE — 90471 IMMUNIZATION ADMIN: CPT

## 2023-10-23 PROCEDURE — 90686 IIV4 VACC NO PRSV 0.5 ML IM: CPT

## 2023-10-26 ENCOUNTER — OFFICE VISIT (OUTPATIENT)
Dept: PHYSICAL THERAPY | Age: 43
End: 2023-10-26
Attending: PHYSICIAN ASSISTANT
Payer: COMMERCIAL

## 2023-10-26 PROCEDURE — 97110 THERAPEUTIC EXERCISES: CPT

## 2023-10-26 NOTE — PROGRESS NOTES
Diagnosis:   Strain of right gastrocnemius muscle, initial encounter (R57.485N)      Referring Provider: Hao Mg  Date of Evaluation:   10/18/2023    Precautions: [facility] / N/A       # Auth Visits: 5 until 12/21/2023     Total Timed Treatment: 40 min  Total Treatment time: 40 min   Charges: TE 3         Treatment Number: 2 of 5 until 12/21/2023    Subjective: Complaints of R calf injury and pain while playing pickelball on 09/29/2023. She went the immediate care same day. Referred to ortho, issued boot. No ecchymosis noted at time of injury or since. Pt out of boot, walking independently, using compression stock. Is able to walk for 1 mile with little to no pain. Unable to run. Hx of ankle sprains. DOI 9/29/2023  Pt describes pain level current 0/10, at best 0/10, at worst /410. Objective:    R ankle DF OKC 12* CKC > 20*  R single heel raise 80%    R Gastrocnemius stretch in standing x 20 sec x 3 discharge HEP   B CKC ankle DF/PF x 10 reps cont HEP  R heel raise PF eccentric lowering x 10 reps add HEP  R heel raise heel raise x 5 reps short rest between sets 2 sets total    L and R hip flex/ER/ABD CKC  LE slides into wall to 90/90 position,(Gmed) x 10 each side, 2nd set with increased knee flexion, cues for neutral spine and pelvis. HEP    Long sit:  R and L ankle eversion with green band x 15 reps each add HEP     HEP: as noted above    Assessment: Pt having no calf symptoms with walking. Calf pain after fatigue reported at 5 reps of single heel raises, HEP and exercise modified to avoid that. Improved ankle DF as noted above, goal 1, improved ability to perform single heel raise, 90% after tx, goal 2. HEP reviewed, minor cues to correct. Program progressed to address decreased ankle eversion strength ( see eval), HO and band issued. Eversion weakness most likely contributes to her hx of chronic sprains and gait with increased lateral foot loading/foot supination.  HEP addressing all goals      Goals: (to be met in 8 visits)  Pt will improve  R ankle DF to > 10* ROM to 0 deg to allow proper heel strike during gait and terminal knee extension in stance, no gait deviations. Pt will improve R hip, knee, ankle strength to 5/5 to ascend/descend 1 flight of stairs reciprocally with single UE assist  Pt will demonstrate increased hip ER/ABD strength to 5/5 to perform stepping and squatting activities without excessive femoral IR/ADD  Pt will improve SLS to >30s to improve safety and independence with gait on uneven surfaces such as grass  Pt to return to running w/o pain or limitations. Pt will be independent and compliant with comprehensive HEP to maintain progress achieved in PT    LEFS Score  LEFS Score: 72.5 % (10/17/2023  9:22 AM)    Plan: Progress accordingly to meet goals.

## 2023-10-30 ENCOUNTER — APPOINTMENT (OUTPATIENT)
Dept: PHYSICAL THERAPY | Age: 43
End: 2023-10-30
Attending: PHYSICIAN ASSISTANT
Payer: COMMERCIAL

## 2023-11-06 ENCOUNTER — OFFICE VISIT (OUTPATIENT)
Dept: PHYSICAL THERAPY | Age: 43
End: 2023-11-06
Attending: PHYSICIAN ASSISTANT
Payer: COMMERCIAL

## 2023-11-06 PROCEDURE — 97110 THERAPEUTIC EXERCISES: CPT

## 2023-11-06 NOTE — PROGRESS NOTES
Diagnosis:   Strain of right gastrocnemius muscle, initial encounter (P24.794W)      Referring Provider: Caren Win  Date of Evaluation:   10/18/2023    Precautions: Insurance Primary/Secondary: [employer] / N/A       # Auth Visits: 5 until 12/21/2023     Total Timed Treatment: 40 min  Total Treatment time: 40 min   Charges: TE 3         Treatment Number: 3 of 5 until 12/21/2023    Subjective: Pt performing her new HEP with no concerns. Has returned to regular exercise, biking, LE PRE's at homw with no calf pain. DOI 9/29/2023  Pt describes   level current 0/10, at best 0/10, at worst /410. Objective:  Bike seat 5 level 1-3 x 5 min    R ankle DF OKC 12* CKC > 20*  R single heel raise 100%    Sit: R knee extension 4+/5  L 5/5  Supine: R hip ER in 90/90 4-/5  Long sit:  R and L ankle eversion with green band x 15 progressed to 20 reps each  HEP     B CKC ankle DF/PF off 6 inch step x 10 reps progress HEP  R heel raise PF eccentric lowering x 10 reps  discharge HEP  R heel raise heel raise x 5 reps full motion, 2 nd set 10 reps each 2 sets full motion progress HEP    Lateral walk, step outs with green band at ankle x 10 each direction x 4 sets add HEP    L and R hip flex/ER/ABD CKC  LE slides into wall to 90/90 position,(Gmed) x 10  each side, 2nd set with increased knee flexion, cues for neutral spine and pelvis. HEP    HEP: as noted above    Assessment:   Pt no calf pain with walking, exercise, any current activities. Reviewed current HEP and progressed program as noted above to promote hip/knee strength and control, gastroc/soleus strength/ankle strength, goals 2,3. Ankle DF maintained with full motion, goal 1. ed ability to perform single heel raise, 90% after tx, goal 2. HO and band issued for additional HEP. No calf pain with any tx. Pt appropriate to initiate walk jog program, goal 5.       Goals: (to be met in 8 visits)  Pt will improve  R ankle DF to > 10* ROM to 0 deg to allow proper heel strike during gait and terminal knee extension in stance, no gait deviations. Pt will improve R hip, knee, ankle strength to 5/5 to ascend/descend 1 flight of stairs reciprocally with single UE assist  Pt will demonstrate increased hip ER/ABD strength to 5/5 to perform stepping and squatting activities without excessive femoral IR/ADD  Pt will improve SLS to >30s to improve safety and independence with gait on uneven surfaces such as grass  Pt to return to running w/o pain or limitations. Pt will be independent and compliant with comprehensive HEP to maintain progress achieved in PT    LEFS Score  LEFS Score: 72.5 % (10/17/2023  9:22 AM)    Plan: Progress accordingly to meet goals. Pt appropriate to initiate walk jog program, goal 5.

## 2023-11-13 ENCOUNTER — OFFICE VISIT (OUTPATIENT)
Dept: PHYSICAL THERAPY | Age: 43
End: 2023-11-13
Attending: PHYSICIAN ASSISTANT
Payer: COMMERCIAL

## 2023-11-13 PROCEDURE — 97110 THERAPEUTIC EXERCISES: CPT

## 2023-11-13 NOTE — PROGRESS NOTES
Diagnosis:   Strain of right gastrocnemius muscle, initial encounter (Z51.528Y)      Referring Provider: Lissette Jefferson  Date of Evaluation:   10/18/2023    Precautions: Insurance Primary/Secondary: [facility] HMO / N/A       # Auth Visits: 5 until 12/21/2023     Total Timed Treatment: 40 min  Total Treatment time: 40 min   Charges: TE 3         Treatment Number: 4 of 5 until 12/21/2023    Subjective   Has returned to light jogging, 2 miles, no calf pain. Performs home exercises and stationary bike, Spinal Restoration program.   No pain    DOI 9/29/2023  Pt describes   level current 0/10, at best 0/10, at worst /410. Objective:  Bike seat 5 level 1-3 x 5 min    Sit: R knee extension 4+/5  L 5/5  Supine: R hip ER in 90/90 4+/5    B CKC ankle DF/PF off 6 inch step x 10 reps progress HEP  R heel raise heel raise x 5 reps off 6 inch step, 2 sets  progress HEP    Lateral walk, step outs with green band at ankle x 10 each direction x 4 sets discharge HEP    L and R hip flex/ER/ABD CKC  LE slides into wall to 90/90 position,(Gmed) x 15  each side, 2nd set with increased knee flexion, cues for neutral spine and pelvis. HEP    L and R forward lunges x 5 each, correct for hip IR/ADD and knee valgus. Single leg dead lift with B UE reach to 18 inch platform x 5 reps 3 sets each add HEP    HEP: as noted above    Assessment: No calf pain with any current activity including exercise, jogging, goal 5. Pt has full single heel raise. Hip ER improved to 4+/5 goal 3. CKC single leg ankle DF with KE is >20*, goal 1. HEP was progressed as noted above to further promote LE strength and control, balance, goal 2,3,4. All tolerated well.  issued for additional HEP. Progressing as expected, on target for all goals. Pt no calf pain with walking, exercise, any current activities. Reviewed current HEP and progressed program as noted above to promote hip/knee strength and control, gastroc/soleus strength/ankle strength, goals 2,3.    Ankle DF maintained with full motion, goal 1. ed ability to perform single heel raise, 90% after tx, goal 2. HO and band issued for additional HEP. No calf pain with any tx. Pt appropriate to initiate walk jog program, goal 5. Goals: (to be met in 8 visits)  Pt will improve  R ankle DF to > 10* ROM to 0 deg to allow proper heel strike during gait and terminal knee extension in stance, no gait deviations. Pt will improve R hip, knee, ankle strength to 5/5 to ascend/descend 1 flight of stairs reciprocally with single UE assist  Pt will demonstrate increased hip ER/ABD strength to 5/5 to perform stepping and squatting activities without excessive femoral IR/ADD  Pt will improve SLS to >30s to improve safety and independence with gait on uneven surfaces such as grass  Pt to return to running w/o pain or limitations. Pt will be independent and compliant with comprehensive HEP to maintain progress achieved in PT    LEFS Score  LEFS Score: 72.5 % (10/17/2023  9:22 AM)    Plan: Progress accordingly.

## 2023-12-06 ENCOUNTER — HOSPITAL ENCOUNTER (OUTPATIENT)
Age: 43
Discharge: HOME | End: 2023-12-06
Payer: COMMERCIAL

## 2023-12-06 VITALS
WEIGHT: 150 LBS | BODY MASS INDEX: 23.54 KG/M2 | OXYGEN SATURATION: 98 % | HEIGHT: 67 IN | HEART RATE: 65 BPM | SYSTOLIC BLOOD PRESSURE: 123 MMHG | DIASTOLIC BLOOD PRESSURE: 96 MMHG | RESPIRATION RATE: 18 BRPM | TEMPERATURE: 98 F

## 2023-12-06 DIAGNOSIS — N30.91 CYSTITIS WITH HEMATURIA: Primary | ICD-10-CM

## 2023-12-06 LAB
B-HCG UR QL: NEGATIVE
BILIRUB UR QL STRIP: NEGATIVE
CLARITY UR: CLEAR
GLUCOSE UR STRIP-MCNC: 100 MG/DL
KETONES UR STRIP-MCNC: NEGATIVE MG/DL
LEUKOCYTE ESTERASE UR QL STRIP: NEGATIVE
NITRITE UR QL STRIP: POSITIVE
PH UR STRIP: 7 [PH]
PROT UR STRIP-MCNC: NEGATIVE MG/DL
SP GR UR STRIP: 1.01
UROBILINOGEN UR STRIP-ACNC: <2 MG/DL

## 2023-12-06 PROCEDURE — 99213 OFFICE O/P EST LOW 20 MIN: CPT | Performed by: NURSE PRACTITIONER

## 2023-12-06 PROCEDURE — 81002 URINALYSIS NONAUTO W/O SCOPE: CPT | Performed by: NURSE PRACTITIONER

## 2023-12-06 PROCEDURE — 81025 URINE PREGNANCY TEST: CPT | Performed by: NURSE PRACTITIONER

## 2023-12-06 RX ORDER — CEFUROXIME AXETIL 500 MG/1
500 TABLET ORAL 2 TIMES DAILY
Qty: 14 TABLET | Refills: 0 | Status: SHIPPED | OUTPATIENT
Start: 2023-12-06 | End: 2023-12-13

## 2023-12-06 NOTE — ED INITIAL ASSESSMENT (HPI)
Pt c/o discomfort w/ urination, frequency & urgency; symptoms started yesterday.  Pt did start taking AZO

## 2023-12-14 ENCOUNTER — APPOINTMENT (OUTPATIENT)
Dept: PHYSICAL THERAPY | Age: 43
End: 2023-12-14
Attending: PHYSICIAN ASSISTANT
Payer: COMMERCIAL

## 2024-01-31 NOTE — TELEPHONE ENCOUNTER
Refill request for:    Requested Prescriptions     Pending Prescriptions Disp Refills    BUPROPION  MG Oral Tablet 24 Hr [Pharmacy Med Name: Bupropion Hydrochloride Er (Xl) 24hr 300 Mg Tab Lupi] 90 tablet 0     Sig: Take 1 tablet (300 mg total) by mouth daily.        Last Prescribed Quantity Refills   08/05/23 90 1     LOV 5/8/2023     Patient was asked to follow-up in: one year    Appointment scheduled: Visit date not found    Medication not on protocol.     # 90 with 1 refills routed to Mable Barrera MD for review

## 2024-02-19 ENCOUNTER — TELEPHONE (OUTPATIENT)
Dept: FAMILY MEDICINE CLINIC | Facility: CLINIC | Age: 44
End: 2024-02-19

## 2024-02-19 DIAGNOSIS — Z00.00 ROUTINE GENERAL MEDICAL EXAMINATION AT A HEALTH CARE FACILITY: Primary | ICD-10-CM

## 2024-02-19 NOTE — TELEPHONE ENCOUNTER
Please enter lab orders for the patient's upcoming physical appointment.     Physical scheduled:   Your appointments       Date & Time Appointment Department ([place])    May 14, 2024 12:30 PM CDT Adult Physical with Quita Barrera MD [facility] ([facility])    PLEASE NOTE - Most insurances allow a Complete Physical once every 366 days. Please schedule accordingly.    Please arrive 15 minutes prior to your scheduled appointment. Please also bring your Insurance card, Photo ID, and your medication bottles or a list of your current medication.    If you no longer require this appointment, please contact your physician office to cancel.              [facility]  [address] [address]  [phone]           Preferred lab: [facility] ([facility])     The patient has been notified to complete fasting labs prior to their physical appointment.

## 2024-03-21 ENCOUNTER — LAB ENCOUNTER (OUTPATIENT)
Dept: LAB | Age: 44
End: 2024-03-21
Attending: FAMILY MEDICINE
Payer: COMMERCIAL

## 2024-03-21 ENCOUNTER — HOSPITAL ENCOUNTER (OUTPATIENT)
Dept: CT IMAGING | Age: 44
Discharge: HOME | End: 2024-03-21
Attending: FAMILY MEDICINE

## 2024-03-21 VITALS
SYSTOLIC BLOOD PRESSURE: 110 MMHG | WEIGHT: 150 LBS | DIASTOLIC BLOOD PRESSURE: 60 MMHG | BODY MASS INDEX: 23.82 KG/M2 | HEIGHT: 66.5 IN

## 2024-03-21 DIAGNOSIS — Z13.9 SPECIAL SCREENING: ICD-10-CM

## 2024-03-21 DIAGNOSIS — Z00.00 ROUTINE GENERAL MEDICAL EXAMINATION AT A HEALTH CARE FACILITY: ICD-10-CM

## 2024-03-21 LAB
ALBUMIN SERPL-MCNC: 3.9 G/DL (ref 3.4–5)
ALBUMIN/GLOB SERPL: 1.3 {RATIO} (ref 1–2)
ALP LIVER SERPL-CCNC: 48 U/L
ALT SERPL-CCNC: 23 U/L
ANION GAP SERPL CALC-SCNC: 4 MMOL/L (ref 0–18)
AST SERPL-CCNC: 15 U/L (ref 15–37)
BILIRUB SERPL-MCNC: 0.8 MG/DL (ref 0.1–2)
BUN BLD-MCNC: 18 MG/DL (ref 9–23)
CALCIUM BLD-MCNC: 8.7 MG/DL (ref 8.5–10.1)
CHLORIDE SERPL-SCNC: 109 MMOL/L (ref 98–112)
CHOLEST SERPL-MCNC: 244 MG/DL (ref ?–200)
CO2 SERPL-SCNC: 26 MMOL/L (ref 21–32)
CREAT BLD-MCNC: 1.18 MG/DL
EGFRCR SERPLBLD CKD-EPI 2021: 59 ML/MIN/1.73M2 (ref 60–?)
ERYTHROCYTE [DISTWIDTH] IN BLOOD BY AUTOMATED COUNT: 12.8 %
FASTING PATIENT LIPID ANSWER: YES
FASTING STATUS PATIENT QL REPORTED: YES
GLOBULIN PLAS-MCNC: 3.1 G/DL (ref 2.8–4.4)
GLUCOSE BLD-MCNC: 101 MG/DL (ref 70–99)
GLUCOSE POC: 82 MG/DL (ref 70–100)
HCT VFR BLD AUTO: 37.2 %
HDL POC: 100 MG/DL (ref 40–60)
HDLC SERPL-MCNC: 128 MG/DL (ref 40–59)
HGB BLD-MCNC: 12.6 G/DL
LDLC SERPL CALC-MCNC: 108 MG/DL (ref ?–100)
MCH RBC QN AUTO: 31.1 PG (ref 26–34)
MCHC RBC AUTO-ENTMCNC: 33.9 G/DL (ref 31–37)
MCV RBC AUTO: 91.9 FL
NONHDLC SERPL-MCNC: 116 MG/DL (ref ?–130)
OSMOLALITY SERPL CALC.SUM OF ELEC: 290 MOSM/KG (ref 275–295)
PLATELET # BLD AUTO: 198 10(3)UL (ref 150–450)
POTASSIUM SERPL-SCNC: 4.2 MMOL/L (ref 3.5–5.1)
PROT SERPL-MCNC: 7 G/DL (ref 6.4–8.2)
RBC # BLD AUTO: 4.05 X10(6)UL
SODIUM SERPL-SCNC: 139 MMOL/L (ref 136–145)
TOTAL CHOLESTEROL POC: 254 MG/DL (ref 0–200)
TRIGL SERPL-MCNC: 49 MG/DL (ref 30–149)
TRIGLYCERIDES POC: 46 MG/DL (ref 0–200)
TSI SER-ACNC: 1.52 MIU/ML (ref 0.36–3.74)
VIT D+METAB SERPL-MCNC: 41.6 NG/ML (ref 30–100)
VLDLC SERPL CALC-MCNC: 8 MG/DL (ref 0–30)
WBC # BLD AUTO: 3.7 X10(3) UL (ref 4–11)

## 2024-03-21 PROCEDURE — 82306 VITAMIN D 25 HYDROXY: CPT

## 2024-03-21 PROCEDURE — 36415 COLL VENOUS BLD VENIPUNCTURE: CPT

## 2024-03-21 PROCEDURE — 80061 LIPID PANEL: CPT

## 2024-03-21 PROCEDURE — 85027 COMPLETE CBC AUTOMATED: CPT

## 2024-03-21 PROCEDURE — 80053 COMPREHEN METABOLIC PANEL: CPT

## 2024-03-21 PROCEDURE — 84443 ASSAY THYROID STIM HORMONE: CPT

## 2024-03-21 NOTE — PROGRESS NOTES
Date of Service 3/21/2024    CHANDA ERVIN  Date of Birth 3/17/1980    Patient Age: 44 year old    PCP: Quita Barrera MD  [address]  [facility] [address]    Heart Scan Consult  Preliminary Heart Scan Score: 0    Previous Screening  Heart Scan Completed Previously: No        Peripheral Vascular Scan Completed Previously: No          Risk Factors  Personal Risk Factors  Non-alterable Risk Factors: Family History      Body Mass Index  Body mass index is 23.85 kg/m².    Blood Pressure     /60     (Normal =< 120/80,  Elevated = 120-129/ >80,  High Stage1 130-139/80-89 , Stage2 >140/>90)    Lipid Profile  Patient was in fasting state: Yes    Cholesterol: 254, done on 3/21/2024.  HDL Cholesterol: >100, done on 3/21/2024.  LDL Cholesterol: NA done on 3/21/2024  TriGlycerides 46, done on 3/21/2024.    Cholesterol Goals  Value   Total  =< 200   HDL  = > 45 Men = > 55 Women   LDL   =< 100   Triglycerides  =< 150       Glucose and Hemoglobin A1C  Lab Results   Component Value Date    GLU 82 03/21/2024     (Normal Fasting Glucose < 100mg/dl )    Nurse Review  Risk factor information and results reviewed with Nurse: Yes    Recommended Follow Up:  Consult your physician regarding:: Final Heart Scan Report;Discuss potential for Incidental Finding;Discuss Potential for Score Variance      Recommendations for Change:  Nutrition Changes:  (follow heart healthy diet)    Cholesterol Modification (goal of therapy depends upon your risk): No Change Needed    Exercise:  (exercises regularly)                   Repeat Heart Scan: 5 years if Calcium Score is 0.0;Discuss with your Physician              [place] Recommended Resources:               Luann JOHNSON RN        Please Contact the Nurse Heart Line with any Questions or Concerns [phone].

## 2024-05-01 RX ORDER — FLUOXETINE HYDROCHLORIDE 20 MG/1
20 CAPSULE ORAL DAILY
Qty: 90 CAPSULE | Refills: 0 | Status: SHIPPED | OUTPATIENT
Start: 2024-05-01

## 2024-05-09 DIAGNOSIS — F32.81 PMDD (PREMENSTRUAL DYSPHORIC DISORDER): ICD-10-CM

## 2024-05-10 RX ORDER — BUPROPION HYDROCHLORIDE 300 MG/1
300 TABLET ORAL DAILY
Qty: 90 TABLET | Refills: 0 | Status: SHIPPED | OUTPATIENT
Start: 2024-05-10

## 2024-05-10 NOTE — TELEPHONE ENCOUNTER
Refill request for:    Requested Prescriptions     Pending Prescriptions Disp Refills    BUPROPION  MG Oral Tablet 24 Hr [Pharmacy Med Name: Bupropion Hydrochloride Er (Xl) 24hr 300 Mg Tab Lupi] 90 tablet 0     Sig: TAKE ONE TABLET BY MOUTH ONCE DAILY        Last Prescribed Quantity Refills   1/31/24 90 0     LOV 5/8/23    Patient was asked to follow-up in: 1 year    Appointment due: May 2024    Appointment scheduled: 6/3/2024 Quita Barrera MD    Medication not on protocol.     # 90 with 0 refills routed to Mable Barrera MD for review

## 2024-06-03 ENCOUNTER — OFFICE VISIT (OUTPATIENT)
Dept: FAMILY MEDICINE CLINIC | Facility: CLINIC | Age: 44
End: 2024-06-03
Payer: COMMERCIAL

## 2024-06-03 VITALS
OXYGEN SATURATION: 99 % | TEMPERATURE: 99 F | BODY MASS INDEX: 24.43 KG/M2 | SYSTOLIC BLOOD PRESSURE: 110 MMHG | WEIGHT: 152 LBS | DIASTOLIC BLOOD PRESSURE: 70 MMHG | HEIGHT: 66 IN | HEART RATE: 64 BPM

## 2024-06-03 DIAGNOSIS — Z12.4 SCREENING FOR CERVICAL CANCER: ICD-10-CM

## 2024-06-03 DIAGNOSIS — Z12.31 VISIT FOR SCREENING MAMMOGRAM: ICD-10-CM

## 2024-06-03 DIAGNOSIS — Z00.00 ROUTINE GENERAL MEDICAL EXAMINATION AT A HEALTH CARE FACILITY: Primary | ICD-10-CM

## 2024-06-03 DIAGNOSIS — D72.819 LEUKOPENIA, UNSPECIFIED TYPE: ICD-10-CM

## 2024-06-03 DIAGNOSIS — Z11.51 SCREENING FOR HUMAN PAPILLOMAVIRUS (HPV): ICD-10-CM

## 2024-06-03 PROCEDURE — 99396 PREV VISIT EST AGE 40-64: CPT | Performed by: FAMILY MEDICINE

## 2024-06-03 PROCEDURE — 87624 HPV HI-RISK TYP POOLED RSLT: CPT | Performed by: FAMILY MEDICINE

## 2024-06-03 PROCEDURE — 88175 CYTOPATH C/V AUTO FLUID REDO: CPT | Performed by: FAMILY MEDICINE

## 2024-06-03 NOTE — PROGRESS NOTES
HPI:   Pina Ivey is a 44 year old female who presents for a complete physical exam.  Last pap: done today 6/3/2024  Last mammogram: 7/2023  Contraception:  vasectomy  Previous colonoscopy:  No previous  Previous DEXA:  /.  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  Now on 20mg daily.  Wellbutrin 300mg daily added 2023.  Feeling good. No SEs.    Monitoring WBC    fhx of heart disease:  heart scan normal 3/2024    3mm incidental lung nodule.  No hx of smoking or 2nd hand smoke.     Wt Readings from Last 6 Encounters:   06/03/24 152 lb (68.9 kg)   03/21/24 150 lb (68 kg)   12/06/23 150 lb (68 kg)   10/03/23 150 lb (68 kg)   05/08/23 154 lb 12.8 oz (70.2 kg)   05/03/22 145 lb 3.2 oz (65.9 kg)     Body mass index is 24.53 kg/m².     Cholesterol, Total (mg/dL)   Date Value   03/21/2024 244 (H)   04/18/2023 190   04/20/2022 192     Total Cholesterol (POC) (mg/dl)   Date Value   03/21/2024 254 (A)     HDL Cholesterol (mg/dL)   Date Value   03/21/2024 128 (H)   04/18/2023 101 (H)   04/20/2022 100 (H)     HDL (POC) (mg/dl)   Date Value   03/21/2024 100 (A)     LDL Cholesterol (mg/dL)   Date Value   03/21/2024 108 (H)   04/18/2023 79   04/20/2022 84     LDL (POC) (no units)   Date Value   03/21/2024      Comment:     NA        Current Outpatient Medications   Medication Sig Dispense Refill    BUPROPION  MG Oral Tablet 24 Hr TAKE ONE TABLET BY MOUTH ONCE DAILY 90 tablet 0    FLUoxetine 20 MG Oral Cap Take 1 capsule (20 mg total) by mouth daily. 90 capsule 0    HYDROcodone-acetaminophen 5-325 MG Oral Tab Take 1 tablet by mouth every 6 (six) hours as needed for Pain. 10 tablet 0      Patient Active Problem List   Diagnosis    PMDD (premenstrual dysphoric disorder)     Past Medical History:    Diminished ovarian reserve    dx  by Dr. Romo    Miscarriage ([facility])    two in 2016 and one in 2019    MRSA (methicillin resistant Staphylococcus aureus)    multiple nasal abscesses; had 3 negative nasal swabs on 3/22/17,  4/19/2017, and 5/19/2017    Visual impairment    contact lenses and glasses      Past Surgical History:   Procedure Laterality Date    D&c of cervix stump  01/18/2016    for miscarriage    Other      In Vitro Fertilization    Skin graft procedure  2010    Following mole removal      Family History   Problem Relation Age of Onset    High Blood Pressure Mother     Heart Disorder Paternal Grandfather     Heart Disease Maternal Grandfather     Heart Attack Maternal Grandfather       Social History:   Social History     Socioeconomic History    Marital status:    Tobacco Use    Smoking status: Never    Smokeless tobacco: Never   Vaping Use    Vaping status: Never Used   Substance and Sexual Activity    Alcohol use: Yes     Comment: 2 x a week    Drug use: No   Other Topics Concern    Caffeine Concern Yes     Comment: 2-3 servings daily    Special Diet No    Exercise Yes     Comment: 7x a week    Seat Belt Yes    Self-Exams No     Social Determinants of Health      [facility]    Social Connections    [facility], [facility]    Housing Stability        REVIEW OF SYSTEMS:   GENERAL: feels well otherwise  SKIN: denies any unusual skin lesions  EYES:no vision problems  LUNGS: denies shortness of breath  CARDIOVASCULAR: denies chest pain  GI: denies abdominal pain,  No constipation or diarrhea  : denies dysuria, vaginal discharge or itching  NEURO: denies headaches  PSYCHE: denies depression or anxiety    EXAM:   /70 (BP Location: Right arm, Patient Position: Sitting, Cuff Size: large)   Pulse 64   Temp 98.8 °F (37.1 °C) (Oral)   Ht 5' 6\" (1.676 m)   Wt 152 lb (68.9 kg)   LMP 11/29/2023 (Approximate)    SpO2 99%   BMI 24.53 kg/m²   Body mass index is 24.53 kg/m².   GENERAL: well developed, well nourished,in no apparent distress  SKIN: no rashes,no suspicious lesions  HEENT: atraumatic, normocephalic, TMs normal  EYES:PERRLA, EOMI,conjunctiva are clear  NECK: supple,no adenopathy  BREAST: BREASTS:  Breasts are symmetrical.    Right breast: The skin, nipple ,and areola appear normal. No nipple inversion.  No nipple discharge. Tissue is mildly nodular. There are no dominant masses.  No palpable abnormality in the axilla.   Left breast:   The skin, nipple ,and areola appear normal. No nipple inversion.  No nipple discharge. Tissue is mildly nodular. There are no dominant masses.  No palpable abnormality in the axilla.   LUNGS: clear to auscultation  CARDIO: RRR without murmur  GI: good BS's,no masses, HSM or tenderness  : VULVA: normal appearing vulva with no masses, tenderness or lesions, VAGINA: normal appearing vagina with normal color and discharge, no lesions, CERVIX: no erythema, no discharge noted UTERUS: uterus is normal size, shape, consistency and nontender, ADNEXA: normal adnexa in size, nontender and no masses  EXTREMITIES: no edema    ASSESSMENT AND PLAN:   Pina Ivey is a 44 year old female who presents for a complete physical exam.  Encounter Diagnoses   Name Primary?    Routine general medical examination at a health care facility Yes    Screening for cervical cancer     Screening for human papillomavirus (HPV)     Visit for screening mammogram     Leukopenia, unspecified type      Pap: done today  Mammogram: Ordered  Labs:  Reviewed. Can repeat cbc  Continue prozac and wellbutrin  Colonoscopy:  Age 46  Vaccines recommended today:  none  Recommended low fat diet and regular exercise.    The patient is asked to return for CPX in 1 year.  Orders Placed This Encounter   Procedures    Hpv Dna  High Risk , Thin Prep Collect    CBC With Differential With Platelet    ThinPrep PAP Smear        Meds & Refills for this Visit:  Requested Prescriptions      No prescriptions requested or ordered in this encounter       Imaging & Consults:  [facility]A 2D+3D SCREENING BILAT (CPT=77067/03452)

## 2024-06-04 LAB — HPV I/H RISK 1 DNA SPEC QL NAA+PROBE: NEGATIVE

## 2024-06-06 LAB
.: NORMAL
.: NORMAL

## 2024-08-02 RX ORDER — FLUOXETINE HYDROCHLORIDE 20 MG/1
20 CAPSULE ORAL DAILY
Qty: 90 CAPSULE | Refills: 3 | Status: SHIPPED | OUTPATIENT
Start: 2024-08-02

## 2024-08-02 NOTE — TELEPHONE ENCOUNTER
Refill request for:    Requested Prescriptions     Pending Prescriptions Disp Refills    FLUOXETINE 20 MG Oral Cap [Pharmacy Med Name: Fluoxetine Hydrochloride 20 Mg Cap Nort] 90 capsule 0     Sig: Take 1 capsule (20 mg total) by mouth daily.        Last Prescribed Quantity Refills   5/1/24 90 0     LOV 6/3/2024     Patient was asked to follow-up in: 1 year    Appointment due: June 2025    Appointment scheduled: Visit date not found    Medication not on protocol.     # 90 with 0 refills routed to Mable Barrera MD for review

## 2024-08-08 DIAGNOSIS — F32.81 PMDD (PREMENSTRUAL DYSPHORIC DISORDER): ICD-10-CM

## 2024-08-08 NOTE — TELEPHONE ENCOUNTER
Refill request for:    Requested Prescriptions     Pending Prescriptions Disp Refills    BUPROPION  MG Oral Tablet 24 Hr [Pharmacy Med Name: Bupropion Hydrochloride Er (Xl) 24hr 300 Mg Tab Lupi] 90 tablet 0     Sig: TAKE ONE TABLET BY MOUTH ONCE DAILY        Last Prescribed Quantity Refills   5/10/24 90 0     LOV 6/3/2024     Patient was asked to follow-up in: 1 year    Appointment scheduled: Visit date not found    Medication not on protocol.     # 90 with 0 refills routed to Mable Barrera MD for review

## 2024-08-09 RX ORDER — BUPROPION HYDROCHLORIDE 300 MG/1
300 TABLET ORAL DAILY
Qty: 90 TABLET | Refills: 3 | Status: SHIPPED | OUTPATIENT
Start: 2024-08-09

## 2024-08-20 ENCOUNTER — HOSPITAL ENCOUNTER (OUTPATIENT)
Dept: MAMMOGRAPHY | Age: 44
Discharge: HOME | End: 2024-08-20
Attending: FAMILY MEDICINE
Payer: COMMERCIAL

## 2024-08-20 ENCOUNTER — LAB ENCOUNTER (OUTPATIENT)
Dept: LAB | Age: 44
End: 2024-08-20
Attending: FAMILY MEDICINE
Payer: COMMERCIAL

## 2024-08-20 DIAGNOSIS — D72.819 LEUKOPENIA, UNSPECIFIED TYPE: ICD-10-CM

## 2024-08-20 DIAGNOSIS — Z12.31 VISIT FOR SCREENING MAMMOGRAM: ICD-10-CM

## 2024-08-20 LAB
BASOPHILS # BLD AUTO: 0.02 X10(3) UL (ref 0–0.2)
BASOPHILS NFR BLD AUTO: 0.5 %
EOSINOPHIL # BLD AUTO: 0.05 X10(3) UL (ref 0–0.7)
EOSINOPHIL NFR BLD AUTO: 1.2 %
ERYTHROCYTE [DISTWIDTH] IN BLOOD BY AUTOMATED COUNT: 14.9 %
HCT VFR BLD AUTO: 36.3 %
HGB BLD-MCNC: 11.9 G/DL
IMM GRANULOCYTES # BLD AUTO: 0.01 X10(3) UL (ref 0–1)
IMM GRANULOCYTES NFR BLD: 0.2 %
LYMPHOCYTES # BLD AUTO: 1.79 X10(3) UL (ref 1–4)
LYMPHOCYTES NFR BLD AUTO: 41.7 %
MCH RBC QN AUTO: 29.5 PG (ref 26–34)
MCHC RBC AUTO-ENTMCNC: 32.8 G/DL (ref 31–37)
MCV RBC AUTO: 90.1 FL
MONOCYTES # BLD AUTO: 0.41 X10(3) UL (ref 0.1–1)
MONOCYTES NFR BLD AUTO: 9.6 %
NEUTROPHILS # BLD AUTO: 2.01 X10 (3) UL (ref 1.5–7.7)
NEUTROPHILS # BLD AUTO: 2.01 X10(3) UL (ref 1.5–7.7)
NEUTROPHILS NFR BLD AUTO: 46.8 %
PLATELET # BLD AUTO: 192 10(3)UL (ref 150–450)
RBC # BLD AUTO: 4.03 X10(6)UL
WBC # BLD AUTO: 4.3 X10(3) UL (ref 4–11)

## 2024-08-20 PROCEDURE — 77067 SCR MAMMO BI INCL CAD: CPT | Performed by: FAMILY MEDICINE

## 2024-08-20 PROCEDURE — 36415 COLL VENOUS BLD VENIPUNCTURE: CPT

## 2024-08-20 PROCEDURE — 77063 BREAST TOMOSYNTHESIS BI: CPT | Performed by: FAMILY MEDICINE

## 2024-08-20 PROCEDURE — 85025 COMPLETE CBC W/AUTO DIFF WBC: CPT

## 2024-10-12 ENCOUNTER — IMMUNIZATION (OUTPATIENT)
Dept: LAB | Age: 44
End: 2024-10-12
Attending: EMERGENCY MEDICINE
Payer: COMMERCIAL

## 2024-10-12 DIAGNOSIS — Z23 NEED FOR VACCINATION: Primary | ICD-10-CM

## 2024-10-12 PROCEDURE — 90656 IIV3 VACC NO PRSV 0.5 ML IM: CPT

## 2024-10-12 PROCEDURE — 90471 IMMUNIZATION ADMIN: CPT

## 2025-03-13 DIAGNOSIS — F32.81 PMDD (PREMENSTRUAL DYSPHORIC DISORDER): ICD-10-CM

## 2025-03-14 RX ORDER — BUPROPION HYDROCHLORIDE 300 MG/1
300 TABLET ORAL DAILY
Qty: 90 TABLET | Refills: 1 | Status: SHIPPED | OUTPATIENT
Start: 2025-03-14

## 2025-03-14 NOTE — TELEPHONE ENCOUNTER
Refill request for:    Requested Prescriptions     Pending Prescriptions Disp Refills    FLUoxetine 20 MG Oral Cap 90 capsule 3     Sig: Take 1 capsule (20 mg total) by mouth daily.    buPROPion  MG Oral Tablet 24 Hr 90 tablet 3     Sig: Take 1 tablet (300 mg total) by mouth daily.      Patient comment: I'm unable to find the pharmacy that I want to use, but [facility]: phone: [phone], fax: [phone]     Fluoxetine 20 MG   Last Prescribed Quantity Refills   8/2/24 90 3   Bupropion 300   Last Prescribed Quantity Refills   8/9/24 90         3       LOV 6/3/2024     Patient was asked to follow-up in: 1 year    Appointment due: June 2026    Appointment scheduled: 6/24/2025 Quita Barrera MD    Medication not on protocol.     # 90 with 3 refills routed to Mable Barrera MD for review  Patient comment: I'm unable to find the pharmacy that I want to use, but [facility]: phone: [phone], fax: [phone]

## 2025-06-03 ENCOUNTER — TELEPHONE (OUTPATIENT)
Dept: FAMILY MEDICINE CLINIC | Facility: CLINIC | Age: 45
End: 2025-06-03

## 2025-06-03 DIAGNOSIS — Z00.00 ROUTINE GENERAL MEDICAL EXAMINATION AT A HEALTH CARE FACILITY: Primary | ICD-10-CM

## 2025-06-03 NOTE — TELEPHONE ENCOUNTER
Please enter lab orders for the patient's upcoming physical appointment.     Physical scheduled:   Your appointments       Date & Time Appointment Department ([place])    Jun 24, 2025 10:30 AM CDT Adult Physical with Quita Barrera MD [facility] ([facility])    PLEASE NOTE - Most insurances allow a Complete Physical once every 366 days. Please schedule accordingly.    Please arrive 15 minutes prior to your scheduled appointment. Please also bring your Insurance card, Photo ID, and your medication bottles or a list of your current medication.    If you no longer require this appointment, please contact your physician office to cancel.              [facility]  [address] [address]  [phone]           Preferred lab: [facility] ([facility])     The patient has been notified to complete fasting labs prior to their physical appointment.

## 2025-06-11 ENCOUNTER — LAB ENCOUNTER (OUTPATIENT)
Dept: LAB | Age: 45
End: 2025-06-11
Attending: FAMILY MEDICINE
Payer: COMMERCIAL

## 2025-06-11 DIAGNOSIS — Z00.00 ROUTINE GENERAL MEDICAL EXAMINATION AT A HEALTH CARE FACILITY: ICD-10-CM

## 2025-06-11 LAB
ALBUMIN SERPL-MCNC: 4.4 G/DL (ref 3.2–4.8)
ALBUMIN/GLOB SERPL: 1.8 {RATIO} (ref 1–2)
ALP LIVER SERPL-CCNC: 46 U/L (ref 37–98)
ALT SERPL-CCNC: 17 U/L (ref 10–49)
ANION GAP SERPL CALC-SCNC: 8 MMOL/L (ref 0–18)
AST SERPL-CCNC: 22 U/L (ref ?–34)
BASOPHILS # BLD AUTO: 0.03 X10(3) UL (ref 0–0.2)
BASOPHILS NFR BLD AUTO: 0.9 %
BILIRUB SERPL-MCNC: 0.6 MG/DL (ref 0.3–1.2)
BUN BLD-MCNC: 18 MG/DL (ref 9–23)
CALCIUM BLD-MCNC: 8.9 MG/DL (ref 8.7–10.6)
CHLORIDE SERPL-SCNC: 104 MMOL/L (ref 98–112)
CHOLEST SERPL-MCNC: 203 MG/DL (ref ?–200)
CO2 SERPL-SCNC: 28 MMOL/L (ref 21–32)
CREAT BLD-MCNC: 1.13 MG/DL (ref 0.55–1.02)
EGFRCR SERPLBLD CKD-EPI 2021: 62 ML/MIN/1.73M2 (ref 60–?)
EOSINOPHIL # BLD AUTO: 0.08 X10(3) UL (ref 0–0.7)
EOSINOPHIL NFR BLD AUTO: 2.4 %
ERYTHROCYTE [DISTWIDTH] IN BLOOD BY AUTOMATED COUNT: 16.2 %
FASTING PATIENT LIPID ANSWER: YES
FASTING STATUS PATIENT QL REPORTED: YES
GLOBULIN PLAS-MCNC: 2.5 G/DL (ref 2–3.5)
GLUCOSE BLD-MCNC: 85 MG/DL (ref 70–99)
HCT VFR BLD AUTO: 32.5 % (ref 35–48)
HDLC SERPL-MCNC: 89 MG/DL (ref 40–59)
HGB BLD-MCNC: 10.4 G/DL (ref 12–16)
IMM GRANULOCYTES # BLD AUTO: 0.01 X10(3) UL (ref 0–1)
IMM GRANULOCYTES NFR BLD: 0.3 %
LDLC SERPL CALC-MCNC: 106 MG/DL (ref ?–100)
LYMPHOCYTES # BLD AUTO: 1.35 X10(3) UL (ref 1–4)
LYMPHOCYTES NFR BLD AUTO: 39.8 %
MCH RBC QN AUTO: 26.6 PG (ref 26–34)
MCHC RBC AUTO-ENTMCNC: 32 G/DL (ref 31–37)
MCV RBC AUTO: 83.1 FL (ref 80–100)
MONOCYTES # BLD AUTO: 0.29 X10(3) UL (ref 0.1–1)
MONOCYTES NFR BLD AUTO: 8.6 %
NEUTROPHILS # BLD AUTO: 1.63 X10 (3) UL (ref 1.5–7.7)
NEUTROPHILS # BLD AUTO: 1.63 X10(3) UL (ref 1.5–7.7)
NEUTROPHILS NFR BLD AUTO: 48 %
NONHDLC SERPL-MCNC: 114 MG/DL (ref ?–130)
OSMOLALITY SERPL CALC.SUM OF ELEC: 291 MOSM/KG (ref 275–295)
PLATELET # BLD AUTO: 222 10(3)UL (ref 150–450)
POTASSIUM SERPL-SCNC: 4.4 MMOL/L (ref 3.5–5.1)
PROT SERPL-MCNC: 6.9 G/DL (ref 5.7–8.2)
RBC # BLD AUTO: 3.91 X10(6)UL (ref 3.8–5.3)
SODIUM SERPL-SCNC: 140 MMOL/L (ref 136–145)
TRIGL SERPL-MCNC: 40 MG/DL (ref 30–149)
TSI SER-ACNC: 1.68 UIU/ML (ref 0.55–4.78)
VIT D+METAB SERPL-MCNC: 45.8 NG/ML (ref 30–100)
VLDLC SERPL CALC-MCNC: 7 MG/DL (ref 0–30)
WBC # BLD AUTO: 3.4 X10(3) UL (ref 4–11)

## 2025-06-11 PROCEDURE — 80061 LIPID PANEL: CPT

## 2025-06-11 PROCEDURE — 85025 COMPLETE CBC W/AUTO DIFF WBC: CPT

## 2025-06-11 PROCEDURE — 82306 VITAMIN D 25 HYDROXY: CPT

## 2025-06-11 PROCEDURE — 80053 COMPREHEN METABOLIC PANEL: CPT

## 2025-06-11 PROCEDURE — 84443 ASSAY THYROID STIM HORMONE: CPT

## 2025-06-11 PROCEDURE — 36415 COLL VENOUS BLD VENIPUNCTURE: CPT

## 2025-06-24 ENCOUNTER — OFFICE VISIT (OUTPATIENT)
Dept: FAMILY MEDICINE CLINIC | Facility: CLINIC | Age: 45
End: 2025-06-24
Payer: COMMERCIAL

## 2025-06-24 ENCOUNTER — LAB ENCOUNTER (OUTPATIENT)
Dept: LAB | Age: 45
End: 2025-06-24
Attending: FAMILY MEDICINE
Payer: COMMERCIAL

## 2025-06-24 VITALS
RESPIRATION RATE: 16 BRPM | DIASTOLIC BLOOD PRESSURE: 68 MMHG | HEART RATE: 66 BPM | TEMPERATURE: 99 F | WEIGHT: 143.81 LBS | SYSTOLIC BLOOD PRESSURE: 112 MMHG | BODY MASS INDEX: 23.11 KG/M2 | HEIGHT: 66 IN

## 2025-06-24 DIAGNOSIS — Z12.31 VISIT FOR SCREENING MAMMOGRAM: ICD-10-CM

## 2025-06-24 DIAGNOSIS — D64.9 ANEMIA, UNSPECIFIED TYPE: ICD-10-CM

## 2025-06-24 DIAGNOSIS — Z00.00 ROUTINE GENERAL MEDICAL EXAMINATION AT A HEALTH CARE FACILITY: Primary | ICD-10-CM

## 2025-06-24 LAB
BASOPHILS # BLD AUTO: 0.03 X10(3) UL (ref 0–0.2)
BASOPHILS NFR BLD AUTO: 0.9 %
DEPRECATED HBV CORE AB SER IA-ACNC: 5 NG/ML (ref 50–306)
EOSINOPHIL # BLD AUTO: 0.05 X10(3) UL (ref 0–0.7)
EOSINOPHIL NFR BLD AUTO: 1.5 %
ERYTHROCYTE [DISTWIDTH] IN BLOOD BY AUTOMATED COUNT: 16.7 %
FOLATE SERPL-MCNC: 35.3 NG/ML (ref 5.4–?)
HCT VFR BLD AUTO: 33.9 % (ref 35–48)
HGB BLD-MCNC: 10.6 G/DL (ref 12–16)
IMM GRANULOCYTES # BLD AUTO: 0.01 X10(3) UL (ref 0–1)
IMM GRANULOCYTES NFR BLD: 0.3 %
IRON SATN MFR SERPL: 20 % (ref 15–50)
IRON SERPL-MCNC: 68 UG/DL (ref 50–170)
LYMPHOCYTES # BLD AUTO: 1.45 X10(3) UL (ref 1–4)
LYMPHOCYTES NFR BLD AUTO: 44.8 %
MCH RBC QN AUTO: 26.8 PG (ref 26–34)
MCHC RBC AUTO-ENTMCNC: 31.3 G/DL (ref 31–37)
MCV RBC AUTO: 85.6 FL (ref 80–100)
MONOCYTES # BLD AUTO: 0.25 X10(3) UL (ref 0.1–1)
MONOCYTES NFR BLD AUTO: 7.7 %
NEUTROPHILS # BLD AUTO: 1.45 X10 (3) UL (ref 1.5–7.7)
NEUTROPHILS # BLD AUTO: 1.45 X10(3) UL (ref 1.5–7.7)
NEUTROPHILS NFR BLD AUTO: 44.8 %
PLATELET # BLD AUTO: 209 10(3)UL (ref 150–450)
RBC # BLD AUTO: 3.96 X10(6)UL (ref 3.8–5.3)
TOTAL IRON BINDING CAPACITY: 343 UG/DL (ref 250–425)
TRANSFERRIN SERPL-MCNC: 272 MG/DL (ref 250–380)
VIT B12 SERPL-MCNC: 771 PG/ML (ref 211–911)
WBC # BLD AUTO: 3.2 X10(3) UL (ref 4–11)

## 2025-06-24 PROCEDURE — 82607 VITAMIN B-12: CPT

## 2025-06-24 PROCEDURE — 36415 COLL VENOUS BLD VENIPUNCTURE: CPT

## 2025-06-24 PROCEDURE — 85025 COMPLETE CBC W/AUTO DIFF WBC: CPT

## 2025-06-24 PROCEDURE — 99396 PREV VISIT EST AGE 40-64: CPT | Performed by: FAMILY MEDICINE

## 2025-06-24 PROCEDURE — 82728 ASSAY OF FERRITIN: CPT

## 2025-06-24 PROCEDURE — 82746 ASSAY OF FOLIC ACID SERUM: CPT

## 2025-06-24 PROCEDURE — 83550 IRON BINDING TEST: CPT

## 2025-06-24 PROCEDURE — 83540 ASSAY OF IRON: CPT

## 2025-06-24 NOTE — PROGRESS NOTES
HPI:   Pina Ivey is a 45 year old female who presents for a complete physical exam.  Last pap: 6/3/2024  Last mammogram: 8/2024  Contraception:  vasectomy  Previous colonoscopy:  No previous  Previous DEXA:  /.  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  Now on 20mg daily.  Wellbutrin 300mg daily added 2023.  Feeling good. No SEs.    Periods every 4-6 weeks.  New anemia.  Started giving blood this year; last time was unable to.     Monitoring WBC    fhx of heart disease:  heart scan normal 3/2024    3mm incidental lung nodule.  No hx of smoking or 2nd hand smoke.     Wt Readings from Last 6 Encounters:   06/24/25 143 lb 12.8 oz (65.2 kg)   06/03/24 152 lb (68.9 kg)   03/21/24 150 lb (68 kg)   12/06/23 150 lb (68 kg)   10/03/23 150 lb (68 kg)   05/08/23 154 lb 12.8 oz (70.2 kg)     Body mass index is 23.21 kg/m².     Cholesterol, Total (mg/dL)   Date Value   06/11/2025 203 (H)   03/21/2024 244 (H)   04/18/2023 190     Total Cholesterol (POC) (mg/dl)   Date Value   03/21/2024 254 (A)     HDL Cholesterol (mg/dL)   Date Value   06/11/2025 89 (H)   03/21/2024 128 (H)   04/18/2023 101 (H)     HDL (POC) (mg/dl)   Date Value   03/21/2024 100 (A)     LDL Cholesterol (mg/dL)   Date Value   06/11/2025 106 (H)   03/21/2024 108 (H)   04/18/2023 79     LDL (POC) (no units)   Date Value   03/21/2024      Comment:     NA        Current Outpatient Medications   Medication Sig Dispense Refill    FLUoxetine 20 MG Oral Cap Take 1 capsule (20 mg total) by mouth daily. 90 capsule 1    buPROPion  MG Oral Tablet 24 Hr Take 1 tablet (300 mg total) by mouth daily. 90 tablet 1      Patient Active Problem List   Diagnosis    PMDD (premenstrual dysphoric disorder)     Past Medical History:    Diminished ovarian reserve    dx by Dr. Romo     Miscarriage (HCC)    two in 2016 and one in 2019    MRSA (methicillin resistant Staphylococcus aureus)    multiple nasal abscesses; had 3 negative nasal swabs on 3/22/17,  4/19/2017, and 5/19/2017    Visual impairment    contact lenses and glasses      Past Surgical History:   Procedure Laterality Date    D&c of cervix stump  01/18/2016    for miscarriage    Other      In Vitro Fertilization    Skin graft procedure  2010    Following mole removal      Family History   Problem Relation Age of Onset    High Blood Pressure Mother     Heart Disorder Paternal Grandfather     Heart Disease Maternal Grandfather     Heart Attack Maternal Grandfather       Social History:   Social History     Socioeconomic History    Marital status:    Tobacco Use    Smoking status: Never    Smokeless tobacco: Never   Vaping Use    Vaping status: Never Used   Substance and Sexual Activity    Alcohol use: Not Currently    Drug use: No   Other Topics Concern    Caffeine Concern Yes     Comment: 3-4 servings daily    Special Diet No    Exercise Yes     Comment: 6-7x a week    Seat Belt Yes    Self-Exams No     Social Drivers of Health      [facility]    Housing Stability        REVIEW OF SYSTEMS:   GENERAL: feels well otherwise  SKIN: denies any unusual skin lesions  EYES:no vision problems  LUNGS: denies shortness of breath  CARDIOVASCULAR: denies chest pain  GI: denies abdominal pain,  No constipation or diarrhea  : denies dysuria, vaginal discharge or itching  NEURO: denies headaches  PSYCHE: denies depression or anxiety    EXAM:   /68   Pulse 66   Temp 98.8 °F (37.1 °C)   Resp 16   Ht 5' 6\" (1.676 m)   Wt 143 lb 12.8 oz (65.2 kg)   LMP 06/04/2025 (Exact Date)   BMI 23.21 kg/m²   Body mass index is 23.21 kg/m².   GENERAL: well developed, well nourished,in no apparent distress  SKIN: no rashes,no suspicious lesions  HEENT: atraumatic, normocephalic, TMs normal  EYES:PERRLA,  EOMI,conjunctiva are clear  NECK: supple,no adenopathy  BREAST: BREASTS:  Breasts are symmetrical.    Right breast: The skin, nipple ,and areola appear normal. No nipple inversion.  No nipple discharge. Tissue is mildly nodular. There are no dominant masses.  No palpable abnormality in the axilla.   Left breast:   The skin, nipple ,and areola appear normal. No nipple inversion.  No nipple discharge. Tissue is mildly nodular. There are no dominant masses.  No palpable abnormality in the axilla.   LUNGS: clear to auscultation  CARDIO: RRR without murmur  GI: good BS's,no masses, HSM or tenderness  : /  EXTREMITIES: no edema    ASSESSMENT AND PLAN:   Pina Ivey is a 45 year old female who presents for a complete physical exam.  Encounter Diagnoses   Name Primary?    Routine general medical examination at a health care facility Yes    Anemia, unspecified type     Visit for screening mammogram        Pap: UTD  Mammogram: Ordered  Labs:  Reviewed. F/u labs ordered  Continue prozac and wellbutrin  Colonoscopy:  Age 46  Vaccines recommended today:  none  Recommended low fat diet and regular exercise.    The patient is asked to return for CPX in 1 year.  Orders Placed This Encounter   Procedures    Iron And Tibc    Ferritin    Vitamin B12    Folic Acid Serum [E]    CBC With Differential With Platelet       Meds & Refills for this Visit:  Requested Prescriptions      No prescriptions requested or ordered in this encounter       Imaging & Consults:  [facility] DOMINGA 2D+3D SCREENING BILAT (CPT=77067/46539)

## 2025-06-24 NOTE — PATIENT INSTRUCTIONS
[facility]  to [facility] an [facility]:  [phone]    [email or web address]    MD Jasvir Bae MD Gonzalo Pandolfi, MD Shivani Kiriluk, DO Praveen Mettu, MD Aditya Dholakia, DO

## 2025-06-27 ENCOUNTER — PATIENT MESSAGE (OUTPATIENT)
Dept: FAMILY MEDICINE CLINIC | Facility: CLINIC | Age: 45
End: 2025-06-27

## 2025-06-27 DIAGNOSIS — D50.9 IRON DEFICIENCY ANEMIA, UNSPECIFIED IRON DEFICIENCY ANEMIA TYPE: Primary | ICD-10-CM

## 2025-08-21 ENCOUNTER — HOSPITAL ENCOUNTER (OUTPATIENT)
Dept: MAMMOGRAPHY | Age: 45
Discharge: HOME | End: 2025-08-21
Attending: FAMILY MEDICINE

## 2025-08-21 DIAGNOSIS — Z12.31 VISIT FOR SCREENING MAMMOGRAM: ICD-10-CM

## 2025-08-21 PROCEDURE — 77067 SCR MAMMO BI INCL CAD: CPT | Performed by: FAMILY MEDICINE

## 2025-08-21 PROCEDURE — 77063 BREAST TOMOSYNTHESIS BI: CPT | Performed by: FAMILY MEDICINE

## (undated) DEVICE — SOL  .9 1000ML BTL

## (undated) DEVICE — GAMMEX® PI HYBRID SIZE 6.5, STERILE POWDER-FREE SURGICAL GLOVE, POLYISOPRENE AND NEOPRENE BLEND: Brand: GAMMEX

## (undated) DEVICE — KENDALL SCD EXPRESS SLEEVES, KNEE LENGTH, MEDIUM: Brand: KENDALL SCD

## (undated) DEVICE — TUBING SUCTION COLLECTION SET

## (undated) DEVICE — GAMMEX® PI HYBRID SIZE 7, STERILE POWDER-FREE SURGICAL GLOVE, POLYISOPRENE AND NEOPRENE BLEND: Brand: GAMMEX

## (undated) DEVICE — CANISTER SAFETOUCH SYST DISP

## (undated) DEVICE — GYN CDS: Brand: MEDLINE INDUSTRIES, INC.

## (undated) NOTE — MR AVS SNAPSHOT
After Visit Summary   3/28/2017    Ratna Ramirez    MRN: [id number]           Visit Information        Provider Department Dept Phone    3/28/2017  9:00 AM  [email or web address]   Outpt [phone]      Your Vitals Were     BP Pulse Ht Wt BMI LM Important note regarding exams that require a referral/authorization: As a service to you, [facility] will obtain any necessary prior authorization required by your benefit plan provided you choose to have your test performed at one of the lo

## (undated) NOTE — MR AVS SNAPSHOT
After Visit Summary   2017    Yoandy Martinez    MRN: [id number]           Visit Information        Provider Department Dept Phone    2017 10:00 AM  [email or web address]   Outpt [phone]      Your Vitals Were     BP Pulse Wt LMP

## (undated) NOTE — MR AVS SNAPSHOT
After Visit Summary   3/26/2021    Lashell Soliz    MRN: [id number]           Visit Information     Date & Time  3/26/2021  9:30 AM Provider  Nela Eddy MD [facility], [address], [place]  Dept.  Phone  [phone]. Your feedback will help us do so. For more information on [employer], please visit [email or web address]                   DO YOU KNOW WHERE TO GO? [facility] are never convenient.  If you are dealing with a   no appointment options available at [facility],   visit [email or web address] or call [phone]. ([phone])

## (undated) NOTE — MR AVS SNAPSHOT
[facility] [address] [place] [id number] [facility] [id number] [phone]               Thank you for choosing us for your health care visit with Patrizia Ayala PA-C.   We are glad to serve you and happy to provide you with this summary of · Over-the-counter Delsym if needed for cough                    Allergies as of Feb 09, 2017     Penicillins Rash                Today's Vital Signs     BP Pulse Temp Height Weight BMI    120/76 mmHg 67 97.8 °F (36.6 °C) (Oral) 67\" 145 lb 22.71 kg/m2

## (undated) NOTE — Clinical Note
IMPRESSION: IUP at 20w5d AMA: low-risk cell free fetal DNA, declined invasive testing IVF Gestation  RECOMMENDATIONS: Continue care with Dr. Kacie Mcdowell Fetal Echocardiogram at 24 weeks Follow-up Growth ultrasound at 32 weeks. Weekly NST's at 36 weeks.